# Patient Record
Sex: MALE | Race: WHITE | NOT HISPANIC OR LATINO | Employment: UNEMPLOYED | URBAN - METROPOLITAN AREA
[De-identification: names, ages, dates, MRNs, and addresses within clinical notes are randomized per-mention and may not be internally consistent; named-entity substitution may affect disease eponyms.]

---
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
## 2022-01-09 ENCOUNTER — HEALTH MAINTENANCE LETTER (OUTPATIENT)
Age: 22
End: 2022-01-09

## 2022-04-19 ENCOUNTER — TELEPHONE (OUTPATIENT)
Dept: PLASTIC SURGERY | Facility: CLINIC | Age: 22
End: 2022-04-19
Payer: COMMERCIAL

## 2022-04-19 NOTE — TELEPHONE ENCOUNTER
[facility] Call Center    Phone Message    May a detailed message be left on voicemail: yes     Reason for Call: Other:     Parviz is calling in requesting getting scheduled with Dr Phelps to start the process of top surgery m to f.    Please call him back to get scheduled.     Action Taken: Message routed to:  [facility] Care    Travel Screening: Not Applicable

## 2022-07-11 NOTE — TELEPHONE ENCOUNTER
FUTURE VISIT INFORMATION      FUTURE VISIT INFORMATION:    Date: 10/25/22    Time: 1:00pm    Location: [facility]  REFERRAL INFORMATION:    Reason for visit/diagnosis  top consult    RECORDS REQUESTED FROM:       No recs to collect

## 2022-10-24 ENCOUNTER — TELEPHONE (OUTPATIENT)
Dept: PLASTIC SURGERY | Facility: CLINIC | Age: 22
End: 2022-10-24

## 2022-10-24 NOTE — TELEPHONE ENCOUNTER
LM for patient confirming appointment tomorrow with  at 1:00.    Call back number was provided.  
no

## 2022-10-25 ENCOUNTER — PRE VISIT (OUTPATIENT)
Dept: PLASTIC SURGERY | Facility: CLINIC | Age: 22
End: 2022-10-25

## 2022-10-25 ENCOUNTER — OFFICE VISIT (OUTPATIENT)
Dept: PLASTIC SURGERY | Facility: CLINIC | Age: 22
End: 2022-10-25
Payer: COMMERCIAL

## 2022-10-25 ENCOUNTER — DOCUMENTATION ONLY (OUTPATIENT)
Dept: PLASTIC SURGERY | Facility: CLINIC | Age: 22
End: 2022-10-25

## 2022-10-25 VITALS
HEIGHT: 65 IN | SYSTOLIC BLOOD PRESSURE: 121 MMHG | DIASTOLIC BLOOD PRESSURE: 86 MMHG | HEART RATE: 118 BPM | TEMPERATURE: 98.1 F | BODY MASS INDEX: 24.16 KG/M2 | OXYGEN SATURATION: 99 % | WEIGHT: 145 LBS

## 2022-10-25 DIAGNOSIS — F64.0 GENDER DYSPHORIA IN ADULT: Primary | ICD-10-CM

## 2022-10-25 PROCEDURE — 99203 OFFICE O/P NEW LOW 30 MIN: CPT | Performed by: SURGERY

## 2022-10-25 ASSESSMENT — PAIN SCALES - GENERAL: PAINLEVEL: NO PAIN (0)

## 2022-10-25 NOTE — PROGRESS NOTES
"PLASTICS NEW TOP   HPI: This is a 22 year old biological female transitioning to male with a history of gender dysphoria and pancreatitis who presents today with both parents (Mother is Edyta, father is Mina) for a consultation for top surgery. His pronouns are he/him and his preferred name is Parviz. He was referred by his insurance provider and supported by [employer], and made his appointment 5-6 months ago. He is not currently seeing a therapist. He has been on testosterone for about a year and a half, via injection, administered by [facility]. He has been living his chosen gender identity/role for a year and a half. He binds with [employer]. No breast lumps, skin puckering, nipple drainage, or other breast problems. No history of breast imaging, including mammogram or breast ultrasound.     Medical Hx: Gender dysphoria. No history of asthma, diabetes mellitus, or GERD. No bleeding, clotting, healing or scarring problems. 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     Surgical Hx: 4 procedures for Pancreatitis (stents) and EGD. Also had wisdom teeth removed without complicated response to anesthesia.     Family Hx: No family history of ovarian cancer. Paternal grandmother had breast cancer in her 80s. Sister has mild von Willebrand's which causes her to bruise easily and have frequent epistaxis. Parents are healthy.     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 Relationship status/family: Has a partner, Mamadou (trans male); lives in the dorms. Will stay with family while recovering from surgery. Smoking status: None. Alcohol use: None. Diet: Omnivore. Caffeine: drinks komboucha sometimes. Also has soda sometimes.  Exercise: likes to go to the gym at school. Sleep: Averages 8 hours.    PE: General: Height: 5' 5.5\"  Weight: 140 lbs   Chest:   Nice upper chest contour.   Slight " concavity over central sternum.  Slight asymmetry in breast shape.  Grade Right 2.5 nipple ptosis; left 2 ptosis.   Right (300 g) breast is slightly larger than the Left (400g).   IMFs situated about 1 cm below the pec muscle.   Right IMF situated about 1-2 cm lower than the left  Incisions may touch at midline..   Minimal lateral thoracic rolls.   No anterior axillary folds.    No lymphadenopathy or masses.   Photos taken with consent.     A&P: 22 year old biological female transitioning to male who is a good candidate for gender affirming top surgery with one of the following: bilateral simple mastectomy vs. breast reduction, +/- possible nipple graft reconstruction. He will most likely need a bilateral simple mastectomy with nipple graft reconstruction depending on intraoperative findings and the patient's desired outcome. The patient is interested in nipple grafts. The patient will NOT need a pre-op mammogram in addition to an H&P from their PCP.     At [facility] he has -[facility] and reports he would prefer a surgery date close to this, as he will have a month off of school in addition to the 2 weeks for winter break. Summer would also be a tenable solution.     He did  meet with our Transgender Coordinator today during his visit. He will be in contact via [employer] to discuss communications with staff and timeline. He will also send his letters of support from PCP and PP through [employer]. He did receive an introductory folder of information and contact numbers/names. Any further discussion of risks and complications will be reviewed during the pre-op visit.    Patient accepts the risks of this procedure and would like to proceed with surgery. He is able to give informed consent for this medically necessary procedure.   Once we receive his therapist letter of support we will initiate the prior authorization process.     According to [place] Case Law and [facility] standards of care, with an appropriate letter of support  from a mental health provider, top surgery/mastectomy is medically necessary for the treatment of gender dysphoria.     Total time = 30 minutes, spent on the date of encounter doing chart review, history and physical, dressing changes, documentation, patient education, and any further activity as noted above.     This note was prepared on behalf of Bria Phelps MD by Rina Garcia, a trained medical scribe, based on my observations and the provider's statements to me.

## 2022-10-25 NOTE — NURSING NOTE
"Chief Complaint   Patient presents with     Consult     Parviz, is being seen today for a consult regarding top surgery.       Vitals:    10/25/22 1304   BP: 121/86   BP Location: Left arm   Patient Position: Chair   Cuff Size: Adult Regular   Pulse: 118   Temp: 98.1  F (36.7  C)   TempSrc: Oral   SpO2: 99%   Weight: 65.8 kg (145 lb)   Height: 1.657 m (5' 5.25\")       Body mass index is 23.94 kg/m .      Trudy Martines LPN    "

## 2022-10-25 NOTE — PROGRESS NOTES
[facility] program and services discussed with patient. Educational surgical packet provided and reviewed with patient. Process for accessing surgery discussed, including: [facility] standards of care, letters of support, treatment plan action steps, [place] insurance process, surgery scheduling, and approximate timeline.     Pt has a J term in school and wants to get surgery early/mid January if possible. [facility] and [facility] to [facility] to [employer].  referral list given in case this provider isn't MH provider per surgeon request. Pt questions answered within scope of practice.

## 2022-10-25 NOTE — LETTER
10/25/2022       RE: China Burgos  [address] [place] [address]     Dear Colleague,    Thank you for referring your patient, China Burgos, to the [facility] PLASTIC AND RECONSTRUCTIVE SURGERY CLINIC [place] at [facility]. Please see a copy of my visit note below.    PLASTICS NEW TOP   HPI: This is a 22 year old biological female transitioning to male with a history of gender dysphoria and pancreatitis who presents today with both parents (Mother is Edyta, father is Mina) for a consultation for top surgery. His pronouns are he/him and his preferred name is Parviz. He was referred by his insurance provider and supported by internet info, and made his appointment 5-6 months ago. He is not currently seeing a therapist. He has been on testosterone for about a year and a half, via injection, administered by [facility]. He has been living his chosen gender identity/role for a year and a half. He binds with [employer]. No breast lumps, skin puckering, nipple drainage, or other breast problems. No history of breast imaging, including mammogram or breast ultrasound.     Medical Hx: Gender dysphoria. No history of asthma, diabetes mellitus, or GERD. No bleeding, clotting, healing or scarring problems. 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     Surgical Hx: 4 procedures for Pancreatitis (stents) and EGD. Also had wisdom teeth removed without complicated response to anesthesia.     Family Hx: No family history of ovarian cancer. Paternal grandmother had breast cancer in her 80s. Sister has mild von Willebrand's which causes her to bruise easily and have frequent epistaxis. Parents are healthy.     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 Relationship status/family: Has a partner, Mamadou (trans male); lives in  "the dorms. Will stay with family while recovering from surgery. Smoking status: None. Alcohol use: None. Diet: Omnivore. Caffeine: drinks komboucha sometimes. Also has soda sometimes.  Exercise: likes to go to the gym at school. Sleep: Averages 8 hours.    PE: General: Height: 5' 5.5\"  Weight: 140 lbs   Chest:   Nice upper chest contour.   Slight concavity over central sternum.  Slight asymmetry in breast shape.  Grade Right 2.5 nipple ptosis; left 2 ptosis.   Right (300 g) breast is slightly larger than the Left (400g).   IMFs situated about 1 cm below the pec muscle.   Right IMF situated about 1-2 cm lower than the left  Incisions may touch at midline..   Minimal lateral thoracic rolls.   No anterior axillary folds.    No lymphadenopathy or masses.   Photos taken with consent.     A&P: 22 year old biological female transitioning to male who is a good candidate for gender affirming top surgery with one of the following: bilateral simple mastectomy vs. breast reduction, +/- possible nipple graft reconstruction. He will most likely need a bilateral simple mastectomy with nipple graft reconstruction depending on intraoperative findings and the patient's desired outcome. The patient is interested in nipple grafts. The patient will NOT need a pre-op mammogram in addition to an H&P from their PCP.     At [facility] [employer] [employer] he has J-term and reports he would prefer a surgery date close to this, as he will have a month off of school in addition to the 2 weeks for winter break. Summer would also be a tenable solution.     He did  meet with our Transgender Coordinator today during his visit. He will be in contact via [employer] to discuss communications with staff and timeline. He will also send his letters of support from PCP and PP through [employer]. He did receive an introductory folder of information and contact numbers/names. Any further discussion of risks and complications will be reviewed during the pre-op visit.    Patient " accepts the risks of this procedure and would like to proceed with surgery. He is able to give informed consent for this medically necessary procedure.   Once we receive his therapist letter of support we will initiate the prior authorization process.     According to [place] Case Law and [facility] standards of care, with an appropriate letter of support from a mental health provider, top surgery/mastectomy is medically necessary for the treatment of gender dysphoria.     Total time = 30 minutes, spent on the date of encounter doing chart review, history and physical, dressing changes, documentation, patient education, and any further activity as noted above.     This note was prepared on behalf of Bria Phelps MD by Rina Garcia, a trained medical scribe, based on my observations and the provider's statements to me.         Again, thank you for allowing me to participate in the care of your patient.      Sincerely,    Bria Phelps MD       [Follow-Up Visit] : a follow-up visit for [FreeTextEntry2] : left knee pain

## 2022-10-26 ENCOUNTER — TELEPHONE (OUTPATIENT)
Dept: PSYCHOLOGY | Facility: CLINIC | Age: 22
End: 2022-10-26

## 2022-10-26 NOTE — TELEPHONE ENCOUNTER
Date: 10/26/2022    Client Name:  China Burgos  Preferred Name: Parviz  MRN: [id number]   : 2000  Age:  22 year old    Presenting Problem / Reason for Assessment:     LOS for top surgery    Suggested Program:  TG    Interested in Couples/Family Therapy?  No    Any legal charges pending?:   No    Patient wishes to be contacted regarding Insurance benefits?:  No    Insurance Benefits to be evaluated.  Note will be entered when validated.    Please Verify Registration    Please send Welcome Packet and document date sent.

## 2022-11-08 ENCOUNTER — VIRTUAL VISIT (OUTPATIENT)
Dept: PSYCHOLOGY | Facility: CLINIC | Age: 22
End: 2022-11-08
Payer: COMMERCIAL

## 2022-11-08 DIAGNOSIS — F64.0 GENDER DYSPHORIA IN ADULT: Primary | ICD-10-CM

## 2022-11-08 PROCEDURE — 90791 PSYCH DIAGNOSTIC EVALUATION: CPT | Mod: GT | Performed by: MARRIAGE & FAMILY THERAPIST

## 2022-11-08 NOTE — PROGRESS NOTES
"[facility]  Program in Human Sexuality  Department of Family Medicine & Community Health  [employer] [facility] [facility] [employer]   [address]               [place] [address]  Phone: [phone]  Fax: [phone]  [email or web address]    Transgender Diagnostic (TG) Diagnostic Assessment Interview      Date of Service: 11/08/22  Client Name: China \"Parviz\"SUZY Burgos  YOB: 2000  22 year old  MRN:  [id number]  Gender/Gender Identity: Male  Treating Provider: Rosie Duran PsyD, LMFT  Program: TG  Type of Session: Assessment  Present in Session: Client  Number of Minutes:  60    Video start time: 1  Video end time: 2    Telemedicine Visit: The patient's condition can be safely assessed and treated via synchronous audio and visual telemedicine encounter.      Reason for Telemedicine Visit: Patient convenience (e.g. access to timely appointments / distance to available provider)    Originating Site (Patient Location): Patient's home    Distant Site (Provider Location): [facility]  Consent:  The patient/guardian has verbally consented to: the potential risks and benefits of telemedicine (video visit) versus in person care; bill my insurance or make self-payment for services provided; and responsibility for payment of non-covered services.     Mode of Communication:  Video Conference via [facility]    As the provider I attest to compliance with applicable laws and regulations related to telemedicine.    Ethnicity:     Any relevant cultural/Caodaism issues? (Minority stress, immigration history, level of acculturation, social orientation, time orientation, verbal communication style, Caodaism/spiritual beliefs, etc.)     N/A at this time.     Referral Source [ will get mailing/phone information, sign Referral/Release & send out Thank You letter]: [facility] Referral    Chief Complaint/Presenting Problem and Goals: " Client reported needing letter of support for top surgery. Client has longstanding history of gender dysphoria with birth assigned sex.     ________________________    Transgender Health Services  Program-Specific Information    History of gender dysphoria [Establish long standing discomfort with birth assigned sex]    Client reported that he didn't realize his feelings around gender identity and come to understanding about it until around age 20. Client reports that a child he felt a strong identification with male characters, remembers viewing himself in male roles.  Client reports that he hit puberty summer going into 4th grade and it felt very traumatic. Client reports getting period, very heavy and hard to manage- very stressful. Client reported that he started growing breasts around the same time. Client reported shame, discomfort and loathing around breast development. Client reports that he hated his breasts from the beginning. Client reported that he had a desire for people to not see his chest, thought he had a fear of people sexualizing him/seeing him as a woman.     Client reported that he fell into taking on an older friend/caretaker role but wasn't happy about it, felt alienated from them. Client reported in middle and high school he developed more of a sexual identity, recognized he was bisexual but when he came out to his mom she was not accepting and he felt immense shame.     Client reported that he struggled with sexual identity a lot, felt like he had to prove it to his parents. Client reported that during his senior year he experienced gender euphoria when being called he/him, trying on male gender expression. Client reported he came out and parents were not accepting, younger sister was, friends were. Client reported that he started testosterone without parents knowing at [facility].    Client reports binding in public, but feeling insecure and dysphoria with this. Client reports  "appreciating the changes that testosterone has brought. Client reports complicated relationship with genitals. Client reports wanting phalloplasty but also wanting to keep vaginal canal, if possible.     Relevant Sexuality Information:    Client reports that he enjoys having different sexual roles, does feel dysphoric about not having a penis, but both he and his partner find ways to have pleasure.  _________________________________________________________________________      Mental Health History: Client reports depression and anxiety, diagnosed in middle school. Client has been on venlafaxine with positive effect. Client reports no SI/SA, no hospitalizations and no current therapy.    Verbally administer Gibson - Suicide Severity Rating Scale (C-SSRS). Use \"Screenings\" tab (Epic left side bar)      Substance Use: Client does not use substances at this time.     Medical History Client has been hospitalized for pancreatitis.     Current Significant Relationship/Partner: Client reports dating Mamadou (robertan) since freshman year of college at [employer]. Client reports strong relationship with his partner, lots of support.     Educational History: [employer] (client wants to be a teacher).     Legal Issues [past, present]: None    _________________________________________________________________________     CONCLUSIONS      Mental Status   Appearance:  Casually dressed and Well groomed  Behavior/relationship to examiner/demeanor:  Cooperative and Engaged  Orientation: Oriented to person, place, time and situation  Speech Rate:  Normal  Speech Spontaneity:  Normal  Mood:  comfortable  Affect:  Appropriate/mood-congruent  Thought Process (Associations):  Logical, Linear and Goal directed  Thought Content:  no evidence of suicidal or homicidal ideation and no overt psychosis  Abnormal Perception:  None  Attention/Concentration:  Normal  Language:  Intact  Insight:  Good  Judgment:  " Good      DSM-5 Diagnosis:  Encounter Diagnosis   Name Primary?     Gender dysphoria in adult Yes       Interactive Complexity  Communication difficulties present during current the psychiatric procedure included:  1. N/A  Conclusions/Recommendations/Initial Treatment Goals: (The treatment plan should be developed from the assessment and in the chart the 1st session following the completion of the assessment.  It needs to be reviewed and signed by the patient and therapist).    The client is a 22 year old Not  or  person assigned female at birth who identifies as male. Based on the client's current report of symptoms, client meets criteria for gender dysphoria in adulthood. The client's mental health concerns affect client's ability to function socially and have been causing clinically significant distress. The client reports no drug/alcohol use frequency and duration. The patient is also struggling with anxiety/depression. Client denies safety concerns. Based on the client's reported symptoms and impact on functioning, the plan for the patient is:  1. Start supportive individual/family therapy with a provider specialized in gender health.     2. Continue to assess the impact of client's family and relational patterns on their current well-being.       Rosie Duran PsyD, LMFT

## 2022-11-08 NOTE — LETTER
2022         Re: Parviz Burgos   : 2000   Primary letter of support for gender confirmation surgery      Dr. Phelps:    Parviz has received services through the [facility]'s Transgender Health Program since 2022. Parviz is a 22-year-old transgender man who initially sought services to receive support and assistance with transition and to obtain letters of support for medical transition goals. I am a licensed couple and family therapist specialized in sexual and gender health and employed at the [employer] since .      I met with Parviz on 2022 in order to provide a primary letter of support for GCS (top surgery). Parviz, was assessed by this provider during an individual therapy session and currently meets the World Professional Association for Transgender Health (WPATH) Standards of Care for surgery of gender dysphoric persons. Parviz has been living continuously in the gender role that is congruent with his gender identity since . Since , he has been meeting with a [facility] at [facility] to seek and begin hormone replacement therapy (HRT). Parviz completed legal transition with name and gender marker changes earlier in .      Upon completion of psychological evaluation, Parviz was diagnosed with 312.89 Gender Dysphoria as his primary diagnosis. Parviz also has been diagnosed with anxiety and depression since adolescence and has been prescribed Venlafaxine with positive effect. It is my opinion that his mood has well-controlled with medication.     Given his strong and persistent gender dysphoria, it is clear that top surgery is indicated and medically necessary to relieve his anatomical dysphoria. I fully support him in pursuing surgical interventions to reduce dysphoria.    Surgery is considered medically necessary treatment by the WPATH Standards of Care for transgender  persons. On behalf of our Transgender Services team, I support his decision to pursue medically necessary surgery at this time.      In some cases, denial of access to this surgery can lead to increased isolation, decreased work performance, and decreased sexual and interpersonal functioning. Surgery is expected to alleviate his dysphoria, increase comfort with self, and improve interpersonal, sexual, and vocational functioning.     Parviz has educated himself about the surgery and is aware of its risks and benefits. He has developed an extensive after care plan. Given his concerns and the availability of this type of surgery, I support his choice of surgeon.     My support is based on the psychological indication for surgery and did not include a physical examination to assess medical contra-indications for the surgical procedure. This letter of support is valid for one year from the date of approval.  If you have any questions, or are in need of additional information, please do not hesitate to contact me.              Rosie Duran PsyD, LANG (she/her/hers)    , Relationship and Sex Therapy     [facility]    [employer]    Family Medicine and Community Health    sexualhealth.[facility]

## 2022-11-15 ENCOUNTER — DOCUMENTATION ONLY (OUTPATIENT)
Dept: PLASTIC SURGERY | Facility: CLINIC | Age: 22
End: 2022-11-15

## 2022-11-15 NOTE — PROGRESS NOTES
[facility] :  Care Coordination Note     SITUATION   Parviz Burgos is a 22 year old adult who is receiving support for:  Care Team  .    BACKGROUND     Reviewed LOS. Meets Criteria. Updated pt list     ASSESSMENT     Surgery              CGC Assessment  Comprehensive Gender Care (CGC) Enrollment: Enrolled  Patient has a therapist: Yes  Name of therapist: Rosie Duran  Letter of support #1: Received  Letter #1 Date: 11/11/22  Surgery being considered: Yes  Mastectomy: Yes          PLAN          Nursing Interventions:       Follow-up plan:  Case request to be placed, surgery to be scheduled.        MINA GOMES LPCC

## 2022-11-21 ENCOUNTER — TELEPHONE (OUTPATIENT)
Dept: PLASTIC SURGERY | Facility: CLINIC | Age: 22
End: 2022-11-21

## 2022-11-21 NOTE — TELEPHONE ENCOUNTER
Pt called to check in about LOS and next steps. Case still needs orders for surgery from Dr. Phelps. Called back and LVM with explanation of process for scheduling and requesting PA.

## 2022-11-29 ENCOUNTER — DOCUMENTATION ONLY (OUTPATIENT)
Dept: PLASTIC SURGERY | Facility: CLINIC | Age: 22
End: 2022-11-29

## 2022-11-29 NOTE — PROGRESS NOTES
Pt has seen Dr Phelps for top surgery consult and has LOS (reviewed) in chart. Requested case request from Dr Phelps.    Gavin Ambrocio RN

## 2022-12-04 DIAGNOSIS — F64.0 GENDER DYSPHORIA IN ADULT: Primary | ICD-10-CM

## 2022-12-04 RX ORDER — CEFAZOLIN SODIUM 2 G/50ML
2 SOLUTION INTRAVENOUS SEE ADMIN INSTRUCTIONS
Status: CANCELLED | OUTPATIENT
Start: 2022-12-04

## 2022-12-04 RX ORDER — CEFAZOLIN SODIUM 2 G/50ML
2 SOLUTION INTRAVENOUS
Status: CANCELLED | OUTPATIENT
Start: 2022-12-04

## 2022-12-12 ENCOUNTER — TELEPHONE (OUTPATIENT)
Dept: PLASTIC SURGERY | Facility: CLINIC | Age: 22
End: 2022-12-12

## 2022-12-12 NOTE — TELEPHONE ENCOUNTER
Parviz called asking for an update on scheduling. He will have to wait until the end of the semester for his surgery.

## 2022-12-20 ENCOUNTER — TELEPHONE (OUTPATIENT)
Dept: PLASTIC SURGERY | Facility: CLINIC | Age: 22
End: 2022-12-20

## 2022-12-20 NOTE — TELEPHONE ENCOUNTER
Spoke with patient regarding scheduling surgery with Dr. Phelps.    Holding date 6/9. Patient wants to discuss with family and plan ample time to prepare after finals. Patient to call writer once discussion with family is completed and date is solidified.    Will schedule pre-surgical consult and post-op appointments. Patient will schedule H&P with PCP. Provided direct number to patient.    __    Sabine Cleary, Senior Perioperative Coordinator, on 12/20/2022 at 9:03 AM  P: [phone]

## 2022-12-21 ENCOUNTER — HOSPITAL ENCOUNTER (OUTPATIENT)
Facility: AMBULATORY SURGERY CENTER | Age: 22
End: 2022-12-21
Attending: SURGERY | Admitting: SURGERY
Payer: COMMERCIAL

## 2022-12-21 NOTE — TELEPHONE ENCOUNTER
Received voicemail from patient on 12/20 regarding rescheduling surgery with Dr. Phelps to 6/14.    RN Care Coordinator: Gavin Ambrocio; [phone]    Surgery is scheduled with Dr. Phelps   Date: 6/14   Location: [facility]  Scheduled per: patient requested timeframe    H&P to be completed by Primary Care team; patient to schedule    COVID test: Patient to take an at-home COVID-19 test 1-2 days before their surgery date. Patient to take a picture of the result and either upload this to [facility], or show the surgeon the picture during pre-op.     Surgical consult: 3/28 - patient requested this to be over Spring Break     Post-op visit(s): 6/20 + 7/25    Patient will receive a phone call from pre-admission nurses 1-2 days prior to surgery with arrival and start time.        Spoke with the patient and was able to confirm all scheduled information.       Patient questions/concerns: N/A       Surgery packet was sent via [facility]    __    Sabine Cleary, Senior Perioperative Coordinator, on 12/21/2022 at 11:59 AM  P: [phone]

## 2022-12-22 ENCOUNTER — DOCUMENTATION ONLY (OUTPATIENT)
Dept: PLASTIC SURGERY | Facility: CLINIC | Age: 22
End: 2022-12-22

## 2022-12-22 NOTE — PROGRESS NOTES
[facility] :  Care Coordination Note     SITUATION   Parviz Burgos is a 22 year old adult who is receiving support for: gender dysphoria.    BACKGROUND     Pt has seen Dr Phelps for gender affirming top surgery consultation  Pt has surgery scheduled for 6/14/23    ASSESSMENT     Pt has LOS in chart, reviewed by   Pt does not need a pre-op mammogram, per Dr Phelps's consultation note.      PLAN     Follow-up plan:    Will contact pt to inform of need for pre-op H&P within 30 days of surgery  Pt to have pre-op surgical consultation with Dr Lenin Ambrocio RN

## 2023-03-28 ENCOUNTER — OFFICE VISIT (OUTPATIENT)
Dept: PLASTIC SURGERY | Facility: CLINIC | Age: 23
End: 2023-03-28
Payer: COMMERCIAL

## 2023-03-28 VITALS
DIASTOLIC BLOOD PRESSURE: 82 MMHG | HEART RATE: 86 BPM | SYSTOLIC BLOOD PRESSURE: 114 MMHG | BODY MASS INDEX: 25.49 KG/M2 | WEIGHT: 153 LBS | OXYGEN SATURATION: 98 % | HEIGHT: 65 IN

## 2023-03-28 DIAGNOSIS — F64.0 GENDER DYSPHORIA IN ADULT: Primary | ICD-10-CM

## 2023-03-28 PROCEDURE — 99212 OFFICE O/P EST SF 10 MIN: CPT | Performed by: SURGERY

## 2023-03-28 ASSESSMENT — PAIN SCALES - GENERAL: PAINLEVEL: NO PAIN (0)

## 2023-03-28 NOTE — LETTER
3/28/2023       RE: Sergio Burgos  [address] [place] [address]     Dear Colleague,    Thank you for referring your patient, Sergio Burgos, to the [facility] PLASTIC AND RECONSTRUCTIVE SURGERY CLINIC [place] [facility]. Please see a copy of my visit note below.    PLASTICS PRE-OP  This is a 22 year old biological female transitioning to male who presents for his pre-op visit prior to bilateral simple mastectomy with nipple graft reconstruction scheduled for 6/14/2023. He is here today by himself. His letter of support from Rosie Duran PsyD, LMFT has been received. History and physical are scheduled. COVID test will be performed at home 24-48 hours prior to surgery.    Sergio's family will be taking care of him postoperatively. By the time surgery is complete he will have finished school at [employer]. He is interested in grad school but is also interested in teaching high school history. 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     Our LPN, Best, discussed periop instructions with the patient including: not eating anything 8 hours prior to surgery, drinking clear liquids up to 2 hours before surgery except for morning medications with a sip of water, the preop shower with surgical soap which was given, and wearing a button- or zip-up shirt on the day of surgery. The patient was also instructed to avoid NSAIDs x 1 week both before AND after surgery, but he may take Tylenol post-op for pain as needed. The patient was provided with a folder of information on kai-op topics, including where the surgery will be.     I discussed the following with the patient; preop, intraop and postop phases of care on the day of surgery, the placement of a bladder catheter during surgery that will likely be removed in recovery, postop cares and limitations with relation to home and work settings, 5-lb weight restriction for the  first 3 weeks postop, and how long to maintain limited activities. We also discussed Zofran, oxycodone, Z-stephy, and antipruritics which will be prescribed. We discussed that preventing constipation will be their responsibility, and we discussed methods such as aloe, prune juice or Miralax.     In addition, we went over the possible risks and complications involved with this elective procedure. These include but are not limited to: infection, bleeding, hematoma/seroma formation, and poor healing (including dehiscence, nipple graft loss, or hypertrophic scarring). We also discussed the possibility of altered chest sensation (either hypo or hypersensitive), residual deformities and asymmetries, possible further surgical revisions, and possible injury to surrounding neurovascular and musculoskeletal structures, including intra-axillary or intra-thoracic. We lastly discussed anesthetic risks including DVT/PE or cardiopulmonary events.      All questions were answered. Eladio will bring any other questions that arise to the day of surgery.    Total time = 10 minutes, spent on the date of encounter doing chart review, history and physical, dressing changes, documentation, patient education, and any further activity as noted above.     This note was prepared on behalf of Bria Phelps MD by Rina Garcia (they/them), a trained medical scribe, based on my observations and the provider's statements to me.     Sincerely,    Bria Phelps MD

## 2023-03-28 NOTE — PROGRESS NOTES
PLASTICS PRE-OP  This is a 22 year old biological female transitioning to male who presents for his pre-op visit prior to bilateral simple mastectomy with nipple graft reconstruction scheduled for 6/14/2023. He is here today by himself. His letter of support from Rosie Duran PsyD, LMFT has been received. History and physical are scheduled. COVID test will be performed at home 24-48 hours prior to surgery.    Sergio's family will be taking care of him postoperatively. By the time surgery is complete he will have finished school at [employer]. He is interested in grad school but is also interested in teaching high school history. Currently writing a 20 page paper on the [employer].     Our LPN, Best, discussed periop instructions with the patient including: not eating anything 8 hours prior to surgery, drinking clear liquids up to 2 hours before surgery except for morning medications with a sip of water, the preop shower with surgical soap which was given, and wearing a button- or zip-up shirt on the day of surgery. The patient was also instructed to avoid NSAIDs x 1 week both before AND after surgery, but he may take Tylenol post-op for pain as needed. The patient was provided with a folder of information on kai-op topics, including where the surgery will be.     I discussed the following with the patient; preop, intraop and postop phases of care on the day of surgery, the placement of a bladder catheter during surgery that will likely be removed in recovery, postop cares and limitations with relation to home and work settings, 5-lb weight restriction for the first 3 weeks postop, and how long to maintain limited activities. We also discussed Zofran, oxycodone, Z-stephy, and antipruritics which will be prescribed. We discussed that preventing constipation will be their responsibility, and we discussed methods such as aloe, prune juice or Miralax.     In addition, we went over the possible risks and complications involved  with this elective procedure. These include but are not limited to: infection, bleeding, hematoma/seroma formation, and poor healing (including dehiscence, nipple graft loss, or hypertrophic scarring). We also discussed the possibility of altered chest sensation (either hypo or hypersensitive), residual deformities and asymmetries, possible further surgical revisions, and possible injury to surrounding neurovascular and musculoskeletal structures, including intra-axillary or intra-thoracic. We lastly discussed anesthetic risks including DVT/PE or cardiopulmonary events.      All questions were answered. Eladio will bring any other questions that arise to the day of surgery.    Total time = 10 minutes, spent on the date of encounter doing chart review, history and physical, dressing changes, documentation, patient education, and any further activity as noted above.     This note was prepared on behalf of Bria Phelps MD by Rina Garcia (they/them), a trained medical scribe, based on my observations and the provider's statements to me.

## 2023-03-28 NOTE — NURSING NOTE
"Chief Complaint   Patient presents with     LLOYD Hill, is being seen today for a pre-op DOS 6/14/23.       Vitals:    03/28/23 1616   BP: 114/82   BP Location: Right arm   Patient Position: Chair   Cuff Size: Adult Regular   Pulse: 86   SpO2: 98%   Weight: 69.4 kg (153 lb)   Height: 1.657 m (5' 5.25\")       Body mass index is 25.27 kg/m .      Trudy Martines LPN    "

## 2023-04-16 ENCOUNTER — HEALTH MAINTENANCE LETTER (OUTPATIENT)
Age: 23
End: 2023-04-16

## 2023-05-31 ENCOUNTER — TRANSFERRED RECORDS (OUTPATIENT)
Dept: HEALTH INFORMATION MANAGEMENT | Facility: CLINIC | Age: 23
End: 2023-05-31
Payer: COMMERCIAL

## 2023-06-12 ENCOUNTER — TELEPHONE (OUTPATIENT)
Dept: PLASTIC SURGERY | Facility: CLINIC | Age: 23
End: 2023-06-12
Payer: COMMERCIAL

## 2023-06-12 NOTE — TELEPHONE ENCOUNTER
Pt left voicemail asking if needing to provide any payment for surgery with Dr Phelps on 6/14. Discussed with pt that our PA team notified us that their insurance approved authorization for this surgery, which typically means the terms of pt's insurance plan will apply. Discussed with pt that this RN does not work on the financial components of surgery and provided the Cost of Care phone number for pt to call. Asked pt to call back if questions are not answered by calling this line. Pt thanked writer for calling and will follow up if needed.

## 2023-06-13 ENCOUNTER — TELEPHONE (OUTPATIENT)
Dept: PLASTIC SURGERY | Facility: CLINIC | Age: 23
End: 2023-06-13
Payer: COMMERCIAL

## 2023-06-13 PROBLEM — F64.0 GENDER DYSPHORIA IN ADULT: Status: ACTIVE | Noted: 2022-12-20

## 2023-06-13 NOTE — TELEPHONE ENCOUNTER
Pt left voicemail stating that his dad tested positive for COVID yesterday and his mom tested positive this morning. He states that in the past week his mom, dad, and he have had similar symptoms, including a cold and sore throat. Pt lives with mom and dad and reports being in frequent, close proximity to them. Pt has surgery with Dr Phelps tomorrow and is wondering if he will need to cancel surgery. Discussed with Dr Phelps who said pt will need to cancel. Discussed with the patient, who is understanding but devastated. Sabine will call pt to offer next available for rescheduling. Pt would like to be contacted if there is a surgery cancellation before the next available surgery date. Pt thanked writer for calling

## 2023-06-13 NOTE — TELEPHONE ENCOUNTER
Spoke with patient regarding rescheduling surgery with Dr. Phelps due to COVID-19 exposure and symptoms.    Surgery is scheduled with Dr. Phelps   Date: 7/7   Location: [facility]  Scheduled per: next available date    H&P already completed by Primary Care team, but since it will be outside of the 30-day window, Dr. Phelps will update DOS      Post-op visit(s):   7/14 with Mya Almonte PA-C, [facility]  8/22 with Dr. Phelps, [facility]  Patient will receive a phone call from pre-admission nurses 1-2 days prior to surgery with arrival and start time. Approximate arrival time/surgery time given to patient: 8:45 AM. Patient aware times are subject to change up until day before surgery.       Spoke with the patient and was able to confirm all scheduled information.       Patient questions/concerns: N/A       Surgery packet was not needed per patient    __    Sabine Cleary, Senior Perioperative Coordinator, on 6/13/2023 at 10:55 AM  P: [phone]

## 2023-07-02 ENCOUNTER — ANESTHESIA EVENT (OUTPATIENT)
Dept: SURGERY | Facility: AMBULATORY SURGERY CENTER | Age: 23
End: 2023-07-02
Payer: COMMERCIAL

## 2023-07-06 NOTE — PROGRESS NOTES
"Plastic Surgery Outpatient Visit    ID: Sergio Garrett Burgos is a 22 year old male s/p bilateral mastectomy with nipple grafts for gender dysphoria 7/7/2023 with Dr. Phelps.     S: Doing well. Drain output <25cc daily. Pain controlled, still taking tylenol every 4-5 hours.     O:  /82   Pulse 108   Temp 98  F (36.7  C)   Ht 1.651 m (5' 5\")   Wt 70.8 kg (156 lb 1.6 oz)   SpO2 97%   BMI 25.98 kg/m     General: NAD  Chest: bilateral chest incisions c/d/i. Nipple grafts well adhered bilaterally. Mild resolving ecchymosis. No significant swelling.     PATH: pending    A/P:  -healing well  -drains removed today  -nipple dressings x1 week  -wrap x1 week  - tape off in 2 weeks.  -Trex/5lb lifting restrictions x2 more weeks.  -RTC 6 weeks with Dr. Lenin Almonte PA-C  Plastic and Reconstructive Surgery    20 minutes spent on the date of the encounter doing chart review, history and physical, dressing changes, documentation and further activity as noted above.      "

## 2023-07-07 ENCOUNTER — MYC MEDICAL ADVICE (OUTPATIENT)
Dept: SURGERY | Facility: AMBULATORY SURGERY CENTER | Age: 23
End: 2023-07-07
Payer: COMMERCIAL

## 2023-07-07 ENCOUNTER — HOSPITAL ENCOUNTER (OUTPATIENT)
Facility: AMBULATORY SURGERY CENTER | Age: 23
Discharge: HOME | End: 2023-07-07
Attending: SURGERY
Payer: COMMERCIAL

## 2023-07-07 ENCOUNTER — ANESTHESIA (OUTPATIENT)
Dept: SURGERY | Facility: AMBULATORY SURGERY CENTER | Age: 23
End: 2023-07-07
Payer: COMMERCIAL

## 2023-07-07 VITALS
DIASTOLIC BLOOD PRESSURE: 88 MMHG | RESPIRATION RATE: 14 BRPM | HEIGHT: 65 IN | BODY MASS INDEX: 25.83 KG/M2 | TEMPERATURE: 97 F | SYSTOLIC BLOOD PRESSURE: 132 MMHG | WEIGHT: 155 LBS | OXYGEN SATURATION: 95 % | HEART RATE: 119 BPM

## 2023-07-07 DIAGNOSIS — F64.0 GENDER DYSPHORIA IN ADULT: Primary | ICD-10-CM

## 2023-07-07 PROCEDURE — 19303 MAST SIMPLE COMPLETE: CPT | Mod: RT

## 2023-07-07 PROCEDURE — 88305 TISSUE EXAM BY PATHOLOGIST: CPT | Mod: TC | Performed by: SURGERY

## 2023-07-07 PROCEDURE — 19350 NIPPLE/AREOLA RECONSTRUCTION: CPT | Mod: LT

## 2023-07-07 PROCEDURE — 88305 TISSUE EXAM BY PATHOLOGIST: CPT | Mod: 26 | Performed by: PATHOLOGY

## 2023-07-07 RX ORDER — CEFAZOLIN SODIUM 2 G/50ML
2 SOLUTION INTRAVENOUS SEE ADMIN INSTRUCTIONS
Status: DISCONTINUED | OUTPATIENT
Start: 2023-07-07 | End: 2023-07-08 | Stop reason: HOSPADM

## 2023-07-07 RX ORDER — HYDROMORPHONE HYDROCHLORIDE 1 MG/ML
0.4 INJECTION, SOLUTION INTRAMUSCULAR; INTRAVENOUS; SUBCUTANEOUS EVERY 5 MIN PRN
Status: DISCONTINUED | OUTPATIENT
Start: 2023-07-07 | End: 2023-07-08 | Stop reason: HOSPADM

## 2023-07-07 RX ORDER — FENTANYL CITRATE 50 UG/ML
INJECTION, SOLUTION INTRAMUSCULAR; INTRAVENOUS PRN
Status: DISCONTINUED | OUTPATIENT
Start: 2023-07-07 | End: 2023-07-07

## 2023-07-07 RX ORDER — BUPIVACAINE HYDROCHLORIDE 2.5 MG/ML
INJECTION, SOLUTION INFILTRATION; PERINEURAL PRN
Status: DISCONTINUED | OUTPATIENT
Start: 2023-07-07 | End: 2023-07-07 | Stop reason: HOSPADM

## 2023-07-07 RX ORDER — HYDRALAZINE HYDROCHLORIDE 20 MG/ML
2.5-5 INJECTION INTRAMUSCULAR; INTRAVENOUS EVERY 10 MIN PRN
Status: DISCONTINUED | OUTPATIENT
Start: 2023-07-07 | End: 2023-07-08 | Stop reason: HOSPADM

## 2023-07-07 RX ORDER — LIDOCAINE HYDROCHLORIDE 20 MG/ML
INJECTION, SOLUTION INFILTRATION; PERINEURAL PRN
Status: DISCONTINUED | OUTPATIENT
Start: 2023-07-07 | End: 2023-07-07

## 2023-07-07 RX ORDER — OXYCODONE HYDROCHLORIDE 5 MG/1
5 TABLET ORAL EVERY 6 HOURS PRN
Qty: 12 TABLET | Refills: 0 | Status: SHIPPED | OUTPATIENT
Start: 2023-07-07 | End: 2023-07-10

## 2023-07-07 RX ORDER — SODIUM CHLORIDE, SODIUM LACTATE, POTASSIUM CHLORIDE, CALCIUM CHLORIDE 600; 310; 30; 20 MG/100ML; MG/100ML; MG/100ML; MG/100ML
INJECTION, SOLUTION INTRAVENOUS CONTINUOUS
Status: DISCONTINUED | OUTPATIENT
Start: 2023-07-07 | End: 2023-07-08 | Stop reason: HOSPADM

## 2023-07-07 RX ORDER — ONDANSETRON 4 MG/1
4 TABLET, ORALLY DISINTEGRATING ORAL EVERY 30 MIN PRN
Status: DISCONTINUED | OUTPATIENT
Start: 2023-07-07 | End: 2023-07-08 | Stop reason: HOSPADM

## 2023-07-07 RX ORDER — HYDROXYZINE HYDROCHLORIDE 25 MG/1
25 TABLET, FILM COATED ORAL 3 TIMES DAILY PRN
Qty: 12 TABLET | Refills: 1 | Status: SHIPPED | OUTPATIENT
Start: 2023-07-07

## 2023-07-07 RX ORDER — METOPROLOL TARTRATE 1 MG/ML
1-2 INJECTION, SOLUTION INTRAVENOUS EVERY 5 MIN PRN
Status: DISCONTINUED | OUTPATIENT
Start: 2023-07-07 | End: 2023-07-08 | Stop reason: HOSPADM

## 2023-07-07 RX ORDER — PROPOFOL 10 MG/ML
INJECTION, EMULSION INTRAVENOUS PRN
Status: DISCONTINUED | OUTPATIENT
Start: 2023-07-07 | End: 2023-07-07

## 2023-07-07 RX ORDER — SODIUM CHLORIDE, SODIUM LACTATE, POTASSIUM CHLORIDE, CALCIUM CHLORIDE 600; 310; 30; 20 MG/100ML; MG/100ML; MG/100ML; MG/100ML
INJECTION, SOLUTION INTRAVENOUS CONTINUOUS PRN
Status: DISCONTINUED | OUTPATIENT
Start: 2023-07-07 | End: 2023-07-07

## 2023-07-07 RX ORDER — ONDANSETRON 2 MG/ML
4 INJECTION INTRAMUSCULAR; INTRAVENOUS EVERY 30 MIN PRN
Status: DISCONTINUED | OUTPATIENT
Start: 2023-07-07 | End: 2023-07-08 | Stop reason: HOSPADM

## 2023-07-07 RX ORDER — FENTANYL CITRATE 50 UG/ML
25 INJECTION, SOLUTION INTRAMUSCULAR; INTRAVENOUS EVERY 5 MIN PRN
Status: DISCONTINUED | OUTPATIENT
Start: 2023-07-07 | End: 2023-07-08 | Stop reason: HOSPADM

## 2023-07-07 RX ORDER — FENTANYL CITRATE 50 UG/ML
50 INJECTION, SOLUTION INTRAMUSCULAR; INTRAVENOUS EVERY 5 MIN PRN
Status: DISCONTINUED | OUTPATIENT
Start: 2023-07-07 | End: 2023-07-08 | Stop reason: HOSPADM

## 2023-07-07 RX ORDER — OXYCODONE HYDROCHLORIDE 5 MG/1
5 TABLET ORAL EVERY 4 HOURS PRN
Status: DISCONTINUED | OUTPATIENT
Start: 2023-07-07 | End: 2023-07-08 | Stop reason: HOSPADM

## 2023-07-07 RX ORDER — ONDANSETRON 4 MG/1
4 TABLET, ORALLY DISINTEGRATING ORAL EVERY 8 HOURS PRN
Qty: 12 TABLET | Refills: 0 | Status: SHIPPED | OUTPATIENT
Start: 2023-07-07

## 2023-07-07 RX ORDER — CEFAZOLIN SODIUM 2 G/50ML
2 SOLUTION INTRAVENOUS
Status: COMPLETED | OUTPATIENT
Start: 2023-07-07 | End: 2023-07-07

## 2023-07-07 RX ORDER — HYDROMORPHONE HYDROCHLORIDE 1 MG/ML
0.2 INJECTION, SOLUTION INTRAMUSCULAR; INTRAVENOUS; SUBCUTANEOUS EVERY 5 MIN PRN
Status: DISCONTINUED | OUTPATIENT
Start: 2023-07-07 | End: 2023-07-08 | Stop reason: HOSPADM

## 2023-07-07 RX ORDER — OXYCODONE HYDROCHLORIDE 5 MG/1
10 TABLET ORAL EVERY 4 HOURS PRN
Status: DISCONTINUED | OUTPATIENT
Start: 2023-07-07 | End: 2023-07-08 | Stop reason: HOSPADM

## 2023-07-07 RX ORDER — AZITHROMYCIN 250 MG/1
TABLET, FILM COATED ORAL
Qty: 6 TABLET | Refills: 0 | Status: SHIPPED | OUTPATIENT
Start: 2023-07-07 | End: 2023-07-12

## 2023-07-07 RX ORDER — PROPOFOL 10 MG/ML
INJECTION, EMULSION INTRAVENOUS CONTINUOUS PRN
Status: DISCONTINUED | OUTPATIENT
Start: 2023-07-07 | End: 2023-07-07

## 2023-07-07 RX ORDER — ONDANSETRON 2 MG/ML
INJECTION INTRAMUSCULAR; INTRAVENOUS PRN
Status: DISCONTINUED | OUTPATIENT
Start: 2023-07-07 | End: 2023-07-07

## 2023-07-07 RX ORDER — DEXAMETHASONE SODIUM PHOSPHATE 4 MG/ML
INJECTION, SOLUTION INTRA-ARTICULAR; INTRALESIONAL; INTRAMUSCULAR; INTRAVENOUS; SOFT TISSUE PRN
Status: DISCONTINUED | OUTPATIENT
Start: 2023-07-07 | End: 2023-07-07

## 2023-07-07 RX ORDER — LIDOCAINE 40 MG/G
CREAM TOPICAL
Status: DISCONTINUED | OUTPATIENT
Start: 2023-07-07 | End: 2023-07-08 | Stop reason: HOSPADM

## 2023-07-07 RX ORDER — ACETAMINOPHEN 325 MG/1
975 TABLET ORAL ONCE
Status: COMPLETED | OUTPATIENT
Start: 2023-07-07 | End: 2023-07-07

## 2023-07-07 RX ORDER — FENTANYL CITRATE 50 UG/ML
25 INJECTION, SOLUTION INTRAMUSCULAR; INTRAVENOUS
Status: DISCONTINUED | OUTPATIENT
Start: 2023-07-07 | End: 2023-07-08 | Stop reason: HOSPADM

## 2023-07-07 RX ADMIN — SODIUM CHLORIDE, SODIUM LACTATE, POTASSIUM CHLORIDE, CALCIUM CHLORIDE: 600; 310; 30; 20 INJECTION, SOLUTION INTRAVENOUS at 12:23

## 2023-07-07 RX ADMIN — PROPOFOL 100 MG: 10 INJECTION, EMULSION INTRAVENOUS at 12:35

## 2023-07-07 RX ADMIN — Medication 0.5 MG: at 14:14

## 2023-07-07 RX ADMIN — FENTANYL CITRATE 150 MCG: 50 INJECTION, SOLUTION INTRAMUSCULAR; INTRAVENOUS at 13:06

## 2023-07-07 RX ADMIN — ACETAMINOPHEN 975 MG: 325 TABLET ORAL at 10:25

## 2023-07-07 RX ADMIN — SODIUM CHLORIDE, SODIUM LACTATE, POTASSIUM CHLORIDE, CALCIUM CHLORIDE: 600; 310; 30; 20 INJECTION, SOLUTION INTRAVENOUS at 14:15

## 2023-07-07 RX ADMIN — PROPOFOL 200 MCG/KG/MIN: 10 INJECTION, EMULSION INTRAVENOUS at 13:07

## 2023-07-07 RX ADMIN — CEFAZOLIN SODIUM 2 G: 2 SOLUTION INTRAVENOUS at 12:23

## 2023-07-07 RX ADMIN — FENTANYL CITRATE 50 MCG: 50 INJECTION, SOLUTION INTRAMUSCULAR; INTRAVENOUS at 12:34

## 2023-07-07 RX ADMIN — PROPOFOL 200 MCG/KG/MIN: 10 INJECTION, EMULSION INTRAVENOUS at 12:34

## 2023-07-07 RX ADMIN — PROPOFOL 150 MCG/KG/MIN: 10 INJECTION, EMULSION INTRAVENOUS at 13:50

## 2023-07-07 RX ADMIN — ONDANSETRON 4 MG: 2 INJECTION INTRAMUSCULAR; INTRAVENOUS at 15:35

## 2023-07-07 RX ADMIN — FENTANYL CITRATE 50 MCG: 50 INJECTION, SOLUTION INTRAMUSCULAR; INTRAVENOUS at 15:26

## 2023-07-07 RX ADMIN — ONDANSETRON 4 MG: 4 TABLET, ORALLY DISINTEGRATING ORAL at 17:07

## 2023-07-07 RX ADMIN — PROPOFOL 125 MCG/KG/MIN: 10 INJECTION, EMULSION INTRAVENOUS at 14:38

## 2023-07-07 RX ADMIN — PROPOFOL 200 MG: 10 INJECTION, EMULSION INTRAVENOUS at 12:34

## 2023-07-07 RX ADMIN — SODIUM CHLORIDE, SODIUM LACTATE, POTASSIUM CHLORIDE, CALCIUM CHLORIDE: 600; 310; 30; 20 INJECTION, SOLUTION INTRAVENOUS at 10:41

## 2023-07-07 RX ADMIN — DEXAMETHASONE SODIUM PHOSPHATE 4 MG: 4 INJECTION, SOLUTION INTRA-ARTICULAR; INTRALESIONAL; INTRAMUSCULAR; INTRAVENOUS; SOFT TISSUE at 12:53

## 2023-07-07 RX ADMIN — OXYCODONE HYDROCHLORIDE 5 MG: 5 TABLET ORAL at 16:20

## 2023-07-07 RX ADMIN — LIDOCAINE HYDROCHLORIDE 100 MG: 20 INJECTION, SOLUTION INFILTRATION; PERINEURAL at 12:34

## 2023-07-07 NOTE — ANESTHESIA PREPROCEDURE EVALUATION
Anesthesia Pre-Procedure Evaluation    Patient: Sergio Burgos   MRN: [id number] : 2000        Procedure : Procedure(s):  MASTECTOMY, BILATERAL, SIMPLE, possible nipple grafts. OnQ          Past Medical History:   Diagnosis Date     NO ACTIVE PROBLEMS       Past Surgical History:   Procedure Laterality Date     NO HISTORY OF SURGERY        Allergies   Allergen Reactions     Cats      Sulfa Antibiotics      Mother has sulfa allergy      Social History     Tobacco Use     Smoking status: Never     Smokeless tobacco: Never     Tobacco comments:     nonsmoking home   Substance Use Topics     Alcohol use: Yes     Comment: rarely; advised to hold 24hrs prior to DOS      Wt Readings from Last 1 Encounters:   23 70.3 kg (155 lb)        Anesthesia Evaluation            ROS/MED HX  ENT/Pulmonary:       Neurologic:       Cardiovascular:       METS/Exercise Tolerance:     Hematologic:       Musculoskeletal:       GI/Hepatic: Comment: H/o pancreatitis      Renal/Genitourinary:       Endo:       Psychiatric/Substance Use:     (+) psychiatric history depression     Infectious Disease:       Malignancy:       Other:            Physical Exam    Airway        Mallampati: I       Respiratory Devices and Support         Dental           Cardiovascular          Rhythm and rate: regular     Pulmonary           breath sounds clear to auscultation           OUTSIDE LABS:  CBC:   Lab Results   Component Value Date    WBC 6.1 2009    WBC 6.9 2008    WBC 9.6 2008    HGB 14.7 (H) 2009    HGB 15.5 (H) 2008    HGB 13.1 2008    HCT 42.1 2009    HCT 46.3 (H) 2008    HCT 38.2 2008     2009     2008     2008     BMP: No results found for: NA, POTASSIUM, CHLORIDE, CO2, BUN, CR, GLC  COAGS: No results found for: PTT, INR, FIBR  POC: No results found for: BGM, HCG, HCGS  HEPATIC: No results found for: ALBUMIN, PROTTOTAL, ALT, AST, GGT,  ALKPHOS, BILITOTAL, BILIDIRECT, VIKA  OTHER: No results found for: PH, LACT, A1C, GEORGE, PHOS, MAG, LIPASE, AMYLASE, TSH, T4, T3, CRP, SED    Anesthesia Plan    ASA Status:  1   NPO Status:  NPO Appropriate    Anesthesia Type: General.     - Airway: LMA   Induction: Intravenous.   Maintenance: TIVA.        Consents    Anesthesia Plan(s) and associated risks, benefits, and realistic alternatives discussed. Questions answered and patient/representative(s) expressed understanding.    - Discussed:     - Discussed with:  Patient         Postoperative Care    Pain management: Oral pain medications, IV analgesics, Multi-modal analgesia.   PONV prophylaxis: Dexamethasone or Solumedrol, Ondansetron (or other 5HT-3)     Comments:                Calin Mcmullen MD

## 2023-07-07 NOTE — ANESTHESIA POSTPROCEDURE EVALUATION
Patient: Sergio Burgos    Procedure: Procedure(s):  MASTECTOMY, BILATERAL, SIMPLE, with nipple grafts. OnQ       Anesthesia Type:  General    Note:  Disposition: Outpatient   Postop Pain Control: Uneventful            Sign Out: Well controlled pain   PONV: No   Neuro/Psych: Uneventful            Sign Out: Acceptable/Baseline neuro status   Airway/Respiratory: Uneventful            Sign Out: Acceptable/Baseline resp. status   CV/Hemodynamics: Uneventful            Sign Out: Acceptable CV status; No obvious hypovolemia; No obvious fluid overload   Other NRE: NONE   DID A NON-ROUTINE EVENT OCCUR? No           Last vitals:  Vitals Value Taken Time   /80 07/07/23 1638   Temp 36.4  C (97.5  F) 07/07/23 1630   Pulse 96 07/07/23 1640   Resp 8 07/07/23 1640   SpO2 97 % 07/07/23 1640   Vitals shown include unvalidated device data.    Electronically Signed By: Mitesh Roblero MD  July 7, 2023  4:54 PM

## 2023-07-07 NOTE — DISCHARGE INSTRUCTIONS
Caring for Your Wiley-Pierce Drain    You have been discharged with a Wiley-Pierce drainage tube. This tube drains fluid from your incision, helping prevent swelling and reducing the risk for infection. The tube is held in place by a few stitches. The drain will be removed when your doctor determines you no longer need it and when the amount of drainage decreases. The color and amount of fluid varies. Right after surgery, the fluid may be bright red and may become clearer over time.   Dressing:  Keep the skin around the tube dry.  If you have a dressing, change it every day.   Wash your hands.  Remove the old bandage. Do not use scissors-you may accidentally cut the tube.  Check for any redness, swelling, drainage, or broken stitches. (Call your doctor with any of these findings).  Wash your hands again.  Wet a cotton swab (Q-tip) and clean around the incision and the tube site. Use normal saline solution (salt and water) or soap and water. Start at the tube site and move outward in a circular motion.   Pat dry.  Put a new bandage on the incision and tube site. Make the bandage large enough to cover the whole incision area.   Tape the bandage in place.  Throw out old materials and wash your hands.   Home Care:  Tape the tube to the skin below the bandage. Make sure to keep some slack in the tube to keep it from pulling out.   DO NOT sleep on the same side as the tube.  Secure the tube and bulb inside your clothing with a safety pin. This helps keep the tube from being pulled out.   Keep the bulb compressed at all times, except when you empty it.  Empty your drain at least twice a day. Empty it more often if the drain is full.   Lift the opening of the drain.  Drain the fluid into a measuring cup.  Record the amount of fluid each time you empty. Share the information with your doctor at your follow-up visit.   Squeeze the bulb with your hands until you hear air coming out of the bulb.  Close the opening.   Tape  plastic wrap over the bandage and tube site when you shower.    Stripping  the tube helps keep blood clots from blocking the tube.                         ONLY DO THIS IF YOUR DOCTOR INSTRUCTED YOU TO DO SO!  Hold the tubing where it leaves the skin with one hand. This keeps it from pulling on the skin.  Pinch the tubing with the thumb and first finger of your other hand.   Slowly and firmly pull your thumb and first finger down the tube (squeezing the tube between your fingers). Keep squeezing the tube as you run your fingers towards the bulb. If the pulling hurts or feels like it is coming out of the skin, STOP. Begin again more gently.  Let go of the tubing with both hands. If the tube is still blocked, repeat these steps three or four times. Make sure that the bulb is compressed so it creates suction.  When to call your doctor:  New or increased pain around the tube  Redness, warmth, or swelling around the incision or tube  Drainage that is foul smelling  Vomiting  Fever over 101 F degrees  Fluid leaking around the tube  Incision seems not to be healing  Stitches become loose  The tube falls out  Drainage that changes from light pick to dark red  A sudden increase or decrease in the amount of drainage (over 30 ml).  Your drainage record:  Date Time Bulb 1: Amount of drainage (ml or cc) Bulb 2: Amount of drainage (ml or cc) Notes                                                                                              ON-Q  C-bloc Continuous Nerve Block Discharge Instructions    The Nerve Block    Your anesthesiologist performed a nerve block (a procedure that blocks pain to only a specific area) by inserting a small tube in your body.  This tube is connected to a pump that will help control your pain.  The pump is shaped like a balloon and is filled with medicine that causes numbness or loss of sensation to help control your pain around the incision or site of injury.  The pain pump contains local anesthetic  and DOES NOT contain opioids.  The medicine in the pump may alter your ability to feel changes in temperature or pressure.  Your doctor will tell you if any special precautions apply to you.  The Pump    DO NOT SQUEEZE THE PUMP.   The pump delivers medicine at a very slow rate.  You will NOT see the medicine moving through the tubing.  As the medicine is delivered, the pump ball will slowly become smaller.  It may take a day or so before you notice a change in the size and look of the pump.  Depending on the size of your pump, it may take 2 - 5 days to give all the medicine.  The middle part of the pump may look like an apple core when empty.      Photo used with permission from [employer].    Managing Your Pain    The Medicine and Infusion Rate is:  ______________________________    The continuous nerve block infusion may not block all of the pain from your surgery so it is important that you take the pain medicines prescribed by your surgeon if you need them.  If you continue to have difficulty with your pain control, please contact the anesthesiologist.    Caring for Your Pump at Home    Wear the pump on the outside of clothing - away from your skin and cold therapy (ice packs).  The delivery rate is accurate only at room temperature.  Make sure the white clamp on the tubing remains open (moves freely on the tubing).  Make sure there are no kinks in the tubing.  Do not tape or cover up the filter.  Protect the pump from sunlight and heat.  When sleeping:   Do not place the pump underneath the bed covers where the pump may become too warm.  DO NOT place the pump on the floor or hang the pump on a bed post as these situations may cause the tubing to get tangled and get pulled out.  Bathing/Showering:    We recommend taking sponge baths until the pump is removed.  It is important to not get the area where the tube enters your body wet.  It is normal for a small amount of fluid to leak from the hole in your body  where the tube is inserted.  If this occurs, reinforce the bandage with another bandage.  The Infusion  Do not turn the infusion off unless your anesthesiologist has told you to do so.  Do not change the flow rate of the infusion unless your anesthesiologist told you to do so.  Changing the flow rate without your doctor s instruction may result in the wrong dose of medicine, which could cause serious injury.  If your anesthesiologist told you to change the rate of your infusion:  Flip open the clear cover.  Turn the white key on the select-a-flow dial to the instructed rate.  (The rate of the infusion should line up with the black arrow at the top of the controller.)    Listen for the click when you move the dial.    Photo used with permission from [employer].    Removing the Tubing  When the pump is empty or if you have been told to stop the infusion you can remove the tubing.  Follow these steps:  Wash your hands.  Clamp the tubing (squeeze the white clamp until you hear or feel a click).  Remove the clear dressing that covers the tubing.  Grasp the tubing close to the skin and gently pull.  If you meet resistance, STOP pulling and page or call your anesthesiologist.  DO NOT cut or forcefully remove the tubing.  After removal, check the end of the tubing for a dark tip.  If you DO NOT see a dark tip, contact your anesthesiologist.  Apply firm pressure over the site until oozing stops.  Wash the area with soap and water, dry with a clean towel and then cover with a bandage.  The pump is not reusable or refillable.  Dispose of it in the trash and wash your hands.   Troubleshooting  Tubing Comes Out From Skin:  If the tube accidentally comes out, check the end of the tubing for a dark tip.  If you see a dark tip simply discard it and use the pain pills prescribed to you by your surgeon.  If you DON T see a dark tip, contact your anesthesiologist.  Tubing Disconnection:  If the tubing accidentally becomes  "disconnected from the pump, DO NOT reconnect the pump to the tubing.  It may have been contaminated with germs.  Close the tubing clamp and immediately contact your anesthesiologist.  Fluid Leaking:  If enough fluid is leaking from the pump or the tubing outside your body to soak through the dressing, close the white clamp on the tubing and contact your anesthesiologist.    Immediately report the following to your anesthesiologist:  Redness, warmth, swelling, or tenderness at the site the tubing was inserted  Increase in pain  Fever, chills, sweats  Bowel or bladder changes  Difficulty breathing  Dizziness, lightheadedness  Blurred vision  Ringing or buzzing in your ears  Metal taste in your mouth  Numbness and/or tingling around your mouth, fingers or toes  Drowsiness  Confusion  Trouble removing the tubing  Dark tip is not present when tubing is removed      Contact information for notifying your Anesthesiologist  CALL the [facility] at:  [phone].  Press \"4\" for the  and let the hospital  know that you are having a problem with a nerve block and that you would like to page the \"adult care inpatient pain management/[facility] team\".  From 7 am - 5 pm, page the \"staff\" physician  From 5 pm - 7 am, page the \"resident\" physician (if no response from \"resident\" physician, call the  back and the \"staff\" physician).             [facility]  Home Care Following Anesthesia  For 24 hours after surgery:  Get plenty of rest.  A responsible adult must stay with you for at least 24 hours after you leave the surgery center.  Do not drive or use heavy equipment.  If you have weakness or tingling, don't drive or use heavy equipment until this feeling goes away.   Do not drink alcohol.   Avoid strenuous or risky activities.  Ask for help when climbing stairs.  You may feel lightheaded.  IF so, sit for a few minutes before standing.  Have " "someone help you get up.   If you have nausea (feel sick to your stomach): Drink only clear liquids such as apple juice, ginger ale, broth or 7-Up.  Rest may also help.  Be sure to drink enough fluids.  Move to a regular diet as you feel able.   You may have a slight fever.  Call the doctor if your fever is over 100 F (37.7 C) (taken under the tongue) or lasts longer than 24 hours.  You may have a dry mouth, a sore throat, muscle aches or trouble sleeping. These should go away after 24 hours.  Do not make important or legal decisions.   It is recommended to avoid smoking.   Today you received a Marcaine or bupivacaine block to numb the nerves near your surgery site.  This is a block using local anesthetic or \"numbing\" medication injected around the nerves to anesthetize or \"numb\" the area supplied by those nerves.  This block is injected into the muscle layer near your surgical site.  The medication may numb the location where you had surgery for 6-18 hours, but may last up to 24 hours.  If your surgical site is an arm or leg you should be careful with your affected limb, since it is possible to injure your limb without being aware of it due to the numbing.  Until full feeling returns, you should guard against bumping or hitting your limb, and avoid extreme hot or cold temperatures on the skin.  As the block wears off, the feeling will return as a tingling or prickly sensation near your surgical site.  You will experience more discomfort from your incision as the feeling returns.  You may want to take a pain pill (a narcotic or Tylenol if this was prescribed by your surgeon) when you start to experience mild pain before the pain beccomes more severe.  If your pain medications do not control your pain you should notifiy your surgeon.    Tips for taking pain medications  To get the best pain relief possible, remember these points:  Take pain medications as directed, before pain becomes severe.  Pain medication can " upset your stomach: taking it with food may help.  Constipation is a common side effect of pain medication. Drink plenty of  fluids.  Eat foods high in fiber. Take a stool softener if recommended by your doctor or pharmacist.  Do not drink alcohol, drive or operate machinery while taking pain medications.  Ask about other ways to control pain, such as with heat, ice or relaxation.    Tylenol/Acetaminophen Consumption    If you feel your pain relief is insufficient, you may take Tylenol/Acetaminophen in addition to your narcotic pain medication.   Be careful not to exceed 4,000 mg of Tylenol/Acetaminophen in a 24 hour period from all sources.  If you are taking extra strength Tylenol/acetaminophen (500 mg), the maximum dose is 8 tablets in 24 hours.  If you are taking regular strength acetaminophen (325 mg), the maximum dose is 12 tablets in 24 hours.    Call a doctor for any of the following:  Signs of infection (fever, growing tenderness at the surgery site, a large amount of drainage or bleeding, severe pain, foul-smelling drainage, redness, swelling).  It has been over 8 to 10 hours since surgery and you are still not able to urinate (pass water).  Headache for over 24 hours.  Numbness, tingling or weakness the day after surgery (if you had spinal anesthesia).  Signs of Covid-19 infection (temperature over 100 degrees, shortness of breath, cough, loss of taste/smell, generalized body aches, persistent headache, chills, sore throat, nausea/vomiting/diarrhea)  Your doctor is:  Dr. Bria Phelps, Plastic Surgery: [phone]                    Or dial [phone] and ask for the resident on call for:  Plastics  For emergency care, call the:  [facility]:  [phone] (TTY for hearing impaired: [phone])

## 2023-07-07 NOTE — CONFIDENTIAL NOTE
Called pt to follow up after receiving a [place] message asking when to arrive for surgery today. Spoke with pt who had just checked in for surgery and has no other questions or needs at this time.

## 2023-07-07 NOTE — BRIEF OP NOTE
[facility]    Brief Operative Note    Pre-operative diagnosis: Gender dysphoria in adult [F64.0]  Post-operative diagnosis Same as pre-operative diagnosis    Procedure: Procedure(s):  MASTECTOMY, BILATERAL, SIMPLE, with nipple grafts. OnQ  Surgeon: Surgeon(s) and Role:     * Bria Phelps MD - Primary     * Nomi Linder MD - Resident - Assisting     * Mya Almonte PA-C  Anesthesia: General   Estimated Blood Loss: 50 ml    Drains: 2x Wiley-Pierce  Specimens:   ID Type Source Tests Collected by Time Destination   1 : LEFT BREAST TISSUE Mastectomy, Simple Breast, Left SURGICAL PATHOLOGY EXAM Bria Phelps MD 7/7/2023  2:54 PM    2 : RIGHT BREAST TISSUE Mastectomy, Simple Breast, Right SURGICAL PATHOLOGY EXAM Bria Phelps MD 7/7/2023  2:55 PM      Findings:   527g removed from the right breast and 612g removed from the left breast.    Complications: None.   Implants: * No implants in log *

## 2023-07-07 NOTE — ANESTHESIA CARE TRANSFER NOTE
Patient: Sergio Burgos    Procedure: Procedure(s):  MASTECTOMY, BILATERAL, SIMPLE, with nipple grafts. OnQ       Diagnosis: Gender dysphoria in adult [F64.0]  Diagnosis Additional Information: No value filed.    Anesthesia Type:   General     Note:    Oropharynx: oropharynx clear of all foreign objects and spontaneously breathing  Level of Consciousness: awake  Oxygen Supplementation: room air      Dentition: dentition unchanged  Vital Signs Stable: post-procedure vital signs reviewed and stable  Report to RN Given: handoff report given  Patient transferred to: PACU  Comments: Uneventful transport   Report to RN  Exchanging well; color natl  Pt responds appropriately to command  IV patent  Lips/teeth/dentition as preop status  Questions answered    Handoff Report: Identifed the Patient, Identified the Reponsible Provider, Reviewed the pertinent medical history, Discussed the surgical course, Reviewed Intra-OP anesthesia mangement and issues during anesthesia, Set expectations for post-procedure period and Allowed opportunity for questions and acknowledgement of understanding      Vitals:  Vitals Value Taken Time   /81 07/07/23 1609   Temp 97    Pulse 104 07/07/23 1613   Resp 11 07/07/23 1613   SpO2 98 % 07/07/23 1613   Vitals shown include unvalidated device data.    Electronically Signed By: JAREN LANDA CRNA  July 7, 2023  4:13 PM

## 2023-07-07 NOTE — ANESTHESIA PROCEDURE NOTES
Airway    Staff -        CRNA: Mirna Austin APRN CRNA       Performed By: CRNA  Consent for Airway        Urgency: elective  Indications and Patient Condition       Indications for airway management: aki-procedural       Induction type:intravenous       Mask difficulty assessment: 1 - vent by mask    Final Airway Details       Final airway type: supraglottic airway    Supraglottic Airway Details        Type: LMA       Brand: I-Gel       LMA size: 4    Post intubation assessment        Placement verified by: capnometry, equal breath sounds and chest rise        Number of attempts at approach: 1       Secured with: silk tape       Ease of procedure: easy       Dentition: Intact

## 2023-07-09 NOTE — OP NOTE
OP NOTE  DOS 7/7/2023    Surgeon: Bria Phelps MD  Resident: Nomi Higuera MD PGY-2  Assistant: Mya Almonte PA-C (ELLIOT did 50% of case)  Preop diagnosis: Gender dysphoria  Postop diagnosis: Same  Procedure: Bilateral simple mastectomy with nipple grafts and On-Q catheter placement.  Anesthesia: GLMA  EBL: 50  IV fluids: 1500  Urine output: 150  Counts correct  Complications none  Drains: MAME X 2  Specimens: Right breast = 527 g, left breast = 612 g    Indications: This is a 22-year-old biological female with a diagnosis of gender dysphoria.  They met WPATH and insurance criteria for gender affirming top surgery.  Due to the breast volume and ptosis, they would require a double incision approach to their mastectomy.  They desired nipple graft reconstruction.    Procedure: The patient was seen in the preoperative wait ing area.  The operative sites were marked including: Sternal notch, sternal midline, inframammary folds, vertical line through the NAC, medial and lateral borders of the breast footplate, transverse line from the mid humerus, and transverse line at the inferior origin of the pectoralis major muscle on flexion.    Informed consent was obtained after reviewing the possible risks and complications, including but not limited to the fol lowing: Infection, bleeding, hematoma/seroma formation, poor healing, possible spitting sutures, possible dehiscence, possible partial or complete loss of nipple grafts, possible injury to surrounding musculoskeletal or neurovascular structures, possible fat necrosis, possible residual deformities or asymmetries, possible injuries to intrathoracic or intra axillary structures, possible altered sensation of the chest wall-either hypo or  hyper sensitivity, and possible anesthesia risks such as DVT, PE, cardiopulmonary arrest.    The patient was then taken to the operating room on a stretcher and transferred to the OR table in the supine position.  After general  anesthesia was administered and the patient was intubated, a Costa was placed.  Thighs and forelegs were supported on pillows and secured with padded safety straps.  The patient already had sequential sarah arash devices on the lower extremities prior to induction.  A lower Gemini hugger was placed.  Arms were secured to arm boards using 6 inch Ace wraps and positioned at 90 degrees to the table.  The patient was then put into a sitting position to make adjustments for symmetry on the table.  The chest breast area was then prepped and draped in the usual fashion using ChloraPrep.  A timeout was taken and the proper patient and procedure were  identified.    We made our inframammary fold incisions using #10 blade scalpels. We then developed our dissection plane with the cautery, leaving approximately 2 cm of subcutaneous fat along the IMF incision before beveling down to the pectoralis fascia.  The breast mound was then undermined superiorly towards the clavicle, medially towards the parasternal border, and laterally past the lateral border of the pectoralis major muscle.   This allowed us to displace the breast mound inferiorly and kody where it overlapped with the IMF incision.  The nipple areolar complex was then harvested sharply with a 10 blade.  These were kept on the back table, wrapped in normal saline gauze, and marked per side.  We then proceeded to amputate the breast mound at the level of IMF overlap.  Additional thinning of the superior skin flap was also done with the cautery to achieve a f lat contour and symmetry.  All resected breast tissue was passed off the back table to be weighed before sending to pathology for permanent histologic examination.    After our initial resection, our incisions were temporarily skin stapled and the patient was put into a sitting position.  This allowed us to make further adjustments with regard to the level and shape of our IMF incision.  These areas were then resected  and added to t he pathology specimens.  Once we were happy with the symmetry with regard to contour and level and shape of our IMF incisions, including extensions laterally for debulking dogears, we then irrigated the dissection pocket with antibiotic saline solution.  Hemostasis was achieved with the cautery.  #15 round channel drains were introduced through a separate stab wound incision laterally and secured with 3-0 nylon suture.  These were drape d along the inferior pocket.  Dual On-Q catheters 10 inches were percutaneously introduced into the dissection pocket from the epigastric region and draped along the superior pocket.  These were secured with benzoin and Tegaderm.    Closure was then initiated with 2-0 Vicryl deep sutures in the capsular and fascial layers to help offload tension.  Dermis was reapproximated with 2-0 and 3-0 Vicryl deep dermal buried sutures.  Skin wa s closed with 4-0 Vicryl running subcuticular suture.  Additional touchups for skin closure were done with 5-0 fast-absorbing gut suture.      We then turned our attention to nipple grafting.  The grafts themselves had been aggressively thinned on the back table to facilitate graft take.  Templates measuring 1.5 x 2 cm were created, and with the patient in the sitting position, were used to help localize the most aesthetically appea ling position for graft placement.  This was more lateral and inferior in keeping with the patient's desire for a masculine appearing chest.  These areas were marked and then de-epithelialized sharply with #15 scalpel.  Hemostasis was achieved with direct pressure and judicious cautery.  The excess areola from the prepared grafts was then trimmed to fit the recipient site.  We did not need to trim excess nipple.  The grafts were sec ured with 5-0 fast-absorbing gut, interrupted and running cuticular sutures.  The central nipple was anchored superiorly and inferiorly.  The graft was pie crusted with an 18-gauge  needle.  The grafts were then secured with a bolster dressing consisting of Xeroform sheets with antibiotic soaked cotton balls held in place with skin staples and 2-0 silk tie overs.  Dermabond Prineo was applied to R incisions.  The MAME drain tubing was trim med and put to bulb suction.  ABD pads were used for drain sponges and 2 Kerlix rolls were used to pad the anterior chest before wrapping the thorax circumferentially with a double long 6 inch Ace wrap for compression.      The patient was then extubated, Costa catheter was removed, and the On-Q catheters were attached to the reservoir containing 550 cc of 0.2% ropivacaine, to be delivered at 2 cc/h per catheter for the next 5 days.   The patient was transferred to a stretcher and taken to the recovery room in satisfactory condition having tolerated the procedure without difficulty or complication.  Final specimen weights: Right breast = 527 g, left breast = 612 g.

## 2023-07-14 ENCOUNTER — OFFICE VISIT (OUTPATIENT)
Dept: PLASTIC SURGERY | Facility: CLINIC | Age: 23
End: 2023-07-14
Payer: COMMERCIAL

## 2023-07-14 VITALS
OXYGEN SATURATION: 97 % | HEART RATE: 108 BPM | TEMPERATURE: 98 F | SYSTOLIC BLOOD PRESSURE: 117 MMHG | BODY MASS INDEX: 26.01 KG/M2 | HEIGHT: 65 IN | WEIGHT: 156.1 LBS | DIASTOLIC BLOOD PRESSURE: 82 MMHG

## 2023-07-14 DIAGNOSIS — F64.0 GENDER DYSPHORIA IN ADULT: Primary | ICD-10-CM

## 2023-07-14 PROCEDURE — 99024 POSTOP FOLLOW-UP VISIT: CPT | Performed by: PHYSICIAN ASSISTANT

## 2023-07-14 ASSESSMENT — PAIN SCALES - GENERAL: PAINLEVEL: NO PAIN (0)

## 2023-07-14 NOTE — PATIENT INSTRUCTIONS
Care of Nipples and Chest Incisions    Change nipple dressings once daily for 1 week. Shower with dressings in place for 1 week. After that, it is ok to shower without dressings on your nipple grafts. No DIRECT shower spray on nipple grafts for 4 weeks from your surgery date, but water running over incisions and grafts is ok.      Nipple graft dressing changes: Cut vaseline gauze to nipple size and place over nipple. Place folded white gauze over vaseline gauze and cover with plastic dressing. Do dressing changes for 1 week from your post op visit. You may then apply a thin layer of Aquaphor to the nipples until healed.     Keep compression on for 1 more week from today. Continue modified T-Mahesh arms and do not life more than 5 pounds for 2 more weeks. At 3 weeks post op you may begin to use your arms as you normally would and you can lift up to 10 pounds until your 6 week post-op appointment with Dr Phelps, when restrictions are typically lifted. Remove the tape from your incisions by 3 weeks post op. You may start moisturizing your incisions with any non-scented, non-glittered lotion 3 weeks from your surgery date. You can start scar care after the tape is removed. Any over the counter scar care is ok, we recommend silicone strips or sheets. These can be purchased on [employer] and at [employer] and [employer].     At one month post op, baseline shoulder motion should return. Full shoulder motion should return by 8 weeks.      When to call:  Sudden increase of swelling or pain on one side  Uncontrolled pain despite pain medication  Worsening of chest swelling   Separation of nipple from chest skin  Redness and warmth in chest area  Fever > 101     Contact the RN between 8-3:30 Mon-Fri with questions or concerns through my chart message via your doctor's name (most efficient) or call at [phone].   For urgent medical issues that cannot wait, call [phone] Mon-Fri 8:-4:30.     After hours, weekends or holidays, call  [phone] and ask to speak to the on call plastic surgeon fellow.

## 2023-07-14 NOTE — NURSING NOTE
"Chief Complaint   Patient presents with     Surgical Followup     1 week post op       Vitals:    07/14/23 0856   BP: 117/82   Pulse: 108   Temp: 98  F (36.7  C)   SpO2: 97%   Weight: 70.8 kg (156 lb 1.6 oz)   Height: 1.651 m (5' 5\")       Body mass index is 25.98 kg/m .      Annika Verdugo RN    "

## 2023-07-14 NOTE — LETTER
"7/14/2023       RE: Sergio Burgos  [address] [place] [address]     Dear Colleague,    Thank you for referring your patient, Sergio Burgos, to the [facility] PLASTIC AND RECONSTRUCTIVE SURGERY CLINIC [place] at [facility]. Please see a copy of my visit note below.    Plastic Surgery Outpatient Visit    ID: Sergio Burgos is a 22 year old male s/p bilateral mastectomy with nipple grafts for gender dysphoria 7/7/2023 with Dr. Phelps.     S: Doing well. Drain output <25cc daily. Pain controlled, still taking tylenol every 4-5 hours.     O:  /82   Pulse 108   Temp 98  F (36.7  C)   Ht 1.651 m (5' 5\")   Wt 70.8 kg (156 lb 1.6 oz)   SpO2 97%   BMI 25.98 kg/m     General: NAD  Chest: bilateral chest incisions c/d/i. Nipple grafts well adhered bilaterally. Mild resolving ecchymosis. No significant swelling.     PATH: pending    A/P:  -healing well  -drains removed today  -nipple dressings x1 week  -wrap x1 week  - tape off in 2 weeks.  -Trex/5lb lifting restrictions x2 more weeks.  -RTC 6 weeks with Dr. Phelps          20 minutes spent on the date of the encounter doing chart review, history and physical, dressing changes, documentation and further activity as noted above.          Again, thank you for allowing me to participate in the care of your patient.      Sincerely,    Mya Almonte PA-C      "

## 2023-07-18 LAB
PATH REPORT.COMMENTS IMP SPEC: NORMAL
PATH REPORT.COMMENTS IMP SPEC: NORMAL
PATH REPORT.FINAL DX SPEC: NORMAL
PATH REPORT.GROSS SPEC: NORMAL
PATH REPORT.MICROSCOPIC SPEC OTHER STN: NORMAL
PATH REPORT.RELEVANT HX SPEC: NORMAL
PHOTO IMAGE: NORMAL

## 2023-08-22 ENCOUNTER — OFFICE VISIT (OUTPATIENT)
Dept: PLASTIC SURGERY | Facility: CLINIC | Age: 23
End: 2023-08-22
Payer: COMMERCIAL

## 2023-08-22 VITALS — DIASTOLIC BLOOD PRESSURE: 75 MMHG | SYSTOLIC BLOOD PRESSURE: 106 MMHG | OXYGEN SATURATION: 96 % | HEART RATE: 92 BPM

## 2023-08-22 DIAGNOSIS — Z90.13 S/P BILATERAL MASTECTOMY: Primary | ICD-10-CM

## 2023-08-22 PROCEDURE — 99024 POSTOP FOLLOW-UP VISIT: CPT | Performed by: SURGERY

## 2023-08-22 ASSESSMENT — PAIN SCALES - GENERAL: PAINLEVEL: NO PAIN (0)

## 2023-08-22 NOTE — PROGRESS NOTES
"PLASTICS POST-OP  This is a 22 year old trans male(he/him) with a history of gender dysphoria who presents 7 weeks post-op after a bilateral simple mastectomy with nipple graft reconstruction on 7/7/2023. He is here today by himself.    Following surgery the first week Eladio barely used any narcotics and had good pain relief with NSAIDs and the OnQ pump. He feels that he is pretty back to normal and even standing up straighter now. His ROM is unlimited.  Sensation is returning.     PE: Chest:  Nice upper chest contour.   Good symmetry, with slightly thicker skin flap on R, including lateral. Only the tiniest of dogears bilaterally.    Scars are mildly thickened bilaterally, and there are a few wider areas where there was some spitting sutures. Some faint striae on R lateral.   Nipples well-healed, with more pigmentation and nipple definition on L.   Photos taken with verbal consent.     A&P: 22 year old trans male (shirley/him) who presents 7 weeks post-op after a bilateral simple mastectomy with nipple graft conservation on 7/7/2023.     Overall Sergio is healing well. He can gradually increase activity as tolerated. ROM today is at 99 % with minimal lateral tightness.    We discussed scar reducing techniques, such as Mederma, silicone strips, vitamin E oil, emu oil, massage and when he may begin using these. For scar care, at home Sergio has been using vaseline for the nipple grafts and silicone tape for the incisions x 8 days/wear time.    Eladio is very pleased with their results and accepting of the slight asymmetries. \"Its been great when choosing shirts to wear now\" and his dysphoria is much improved.     For activities, Eladio would like to start using weights again, as well as bicycling, swimming and paddle boarding. He was encouraged to ramp up gradually over the next 2 weeks for most of these. Submerging under water is OK.     He will follow up 6-12 months post-op. Causes for returning sooner were discussed. "     Total time = 20 minutes, spent on the date of encounter doing chart review, history and physical, dressing changes, documentation, patient education, and any further activity as noted above.     This note was prepared on behalf of Bria Phelps MD by Rina Garcia (they/them), a trained medical scribe, based on my observations and the provider's statements to me.

## 2023-08-22 NOTE — LETTER
"8/22/2023       RE: Sergio Burgos  [address] [place] [address]     Dear Colleague,    Thank you for referring your patient, Sergio Burgos, to the [facility] PLASTIC AND RECONSTRUCTIVE SURGERY CLINIC [place] at [facility]. Please see a copy of my visit note below.    PLASTICS POST-OP  This is a 22 year old trans male(he/him) with a history of gender dysphoria who presents 7 weeks post-op after a bilateral simple mastectomy with nipple graft reconstruction on 7/7/2023. He is here today by himself.    Following surgery the first week Eladio barely used any narcotics and had good pain relief with NSAIDs and the OnQ pump. He feels that he is pretty back to normal and even standing up straighter now. His ROM is unlimited.  Sensation is returning.     PE: Chest:  Nice upper chest contour.   Good symmetry, with slightly thicker skin flap on R, including lateral. Only the tiniest of dogears bilaterally.    Scars are mildly thickened bilaterally, and there are a few wider areas where there was some spitting sutures. Some faint striae on R lateral.   Nipples well-healed, with more pigmentation and nipple definition on L.   Photos taken with verbal consent.     A&P: 22 year old trans male (he/him) who presents 7 weeks post-op after a bilateral simple mastectomy with nipple graft conservation on 7/7/2023.     Overall Sergio is healing well. He can gradually increase activity as tolerated. ROM today is at 99 % with minimal lateral tightness.    We discussed scar reducing techniques, such as Mederma, silicone strips, vitamin E oil, emu oil, massage and when he may begin using these. For scar care, at home Sergio has been using vaseline for the nipple grafts and silicone tape for the incisions x 8 days/wear time.    Eladio is very pleased with their results and accepting of the slight asymmetries. \"Its been great when choosing shirts to " "wear now\" and his dysphoria is much improved.     For activities, Eladio would like to start using weights again, as well as bicycling, swimming and paddle boarding. He was encouraged to ramp up gradually over the next 2 weeks for most of these. Submerging under water is OK.     He will follow up 6-12 months post-op. Causes for returning sooner were discussed.     Total time = 20 minutes, spent on the date of encounter doing chart review, history and physical, dressing changes, documentation, patient education, and any further activity as noted above.     This note was prepared on behalf of Bria Phelps MD by Rina Garcia (they/them), a trained medical scribe, based on my observations and the provider's statements to me.         Again, thank you for allowing me to participate in the care of your patient.      Sincerely,    Bria Phelps MD    "

## 2023-08-22 NOTE — NURSING NOTE
Chief Complaint   Patient presents with    Post-Op - General Surgery       Vitals:    08/22/23 0828   BP: 106/75   BP Location: Left arm   Patient Position: Sitting   Cuff Size: Adult Regular   Pulse: 92   SpO2: 96%       There is no height or weight on file to calculate BMI.    Leonel Cruz EMT-P

## 2023-12-26 ENCOUNTER — OFFICE VISIT (OUTPATIENT)
Dept: PLASTIC SURGERY | Facility: CLINIC | Age: 23
End: 2023-12-26
Payer: COMMERCIAL

## 2023-12-26 ENCOUNTER — TELEPHONE (OUTPATIENT)
Dept: PLASTIC SURGERY | Facility: CLINIC | Age: 23
End: 2023-12-26

## 2023-12-26 VITALS
SYSTOLIC BLOOD PRESSURE: 124 MMHG | OXYGEN SATURATION: 99 % | HEART RATE: 93 BPM | DIASTOLIC BLOOD PRESSURE: 77 MMHG | BODY MASS INDEX: 27.41 KG/M2 | WEIGHT: 164.5 LBS | HEIGHT: 65 IN

## 2023-12-26 DIAGNOSIS — L91.0 SCARRING, HYPERTROPHIC: Primary | ICD-10-CM

## 2023-12-26 DIAGNOSIS — Z90.13 S/P BILATERAL MASTECTOMY: ICD-10-CM

## 2023-12-26 PROCEDURE — 99213 OFFICE O/P EST LOW 20 MIN: CPT | Performed by: SURGERY

## 2023-12-26 RX ORDER — EMTRICITABINE AND TENOFOVIR DISOPROXIL FUMARATE 200; 300 MG/1; MG/1
1 TABLET, FILM COATED ORAL
COMMUNITY
Start: 2023-10-16

## 2023-12-26 RX ORDER — NEEDLES, DISPOSABLE 25GX5/8"
NEEDLE, DISPOSABLE MISCELLANEOUS
COMMUNITY
Start: 2023-10-16

## 2023-12-26 RX ORDER — TESTOSTERONE CYPIONATE 200 MG/ML
INJECTION, SOLUTION INTRAMUSCULAR
COMMUNITY
Start: 2023-10-16 | End: 2023-12-26

## 2023-12-26 RX ORDER — SYRINGE, DISPOSABLE, 1 ML
SYRINGE, EMPTY DISPOSABLE MISCELLANEOUS SEE ADMIN INSTRUCTIONS
COMMUNITY
Start: 2023-10-16

## 2023-12-26 ASSESSMENT — PAIN SCALES - GENERAL: PAINLEVEL: NO PAIN (1)

## 2023-12-26 NOTE — TELEPHONE ENCOUNTER
Called patient and LVM requesting they come in earlier for their 4 PM appointment today with Dr. Phelps.  Braulio Braun, EMT

## 2023-12-26 NOTE — PROGRESS NOTES
PLASTICS POST-OP  This is a 23 year old trans male with a history of gender dysphoria who presents 25 weeks, almost 6 months post-op after a bilateral simple mastectomy with nipple graft reconstruction on 7/7/23. He is here today by himself with concerns regarding scar thickness.     PE: Chest: Nice upper chest contour.  Patient states the incisions feel a bit tight upon movement sometimes, particularly when reaching cross body.  Slightly thickened nipples, a bit smaller on the right side. Slightly thicker skin flap on right side. L nipple has some probable inclusion cyst.   Hypertrophic scarring, with more widening laterally.   Faint striae bilaterally - old medial, new lateral.  Good symmetry.   Photos taken with verbal consent.     A&P: 23 year old trans male who presents 25 weeks post-op after a bilateral simple mastectomy with nipple graft conservation on 7/7/23.     Overall Eladio is healing well.  Patient reports intermediate itchiness on incision scars with no tenderness and some lateral tightness upon stretching, right side less than left. States he used silicone tape and silicone gel for about 3 months on his scars. Tried some massage but wasn't sure how hard to push, how to do it, etc. so he stopped. Also applies vaseline every day.    We discussed scar reducing techniques, such as Mederma, silicone strips, vitamin E oil, emu oil, massage and when he may begin using these. Discussed possibility of steroid injections to flatten incision scars and/or reduce itchiness, as well as other options for scar appearance management.     Patient states he is interested in trying the steroid injections. Also discussed inclusion cysts, per patient observation regarding nipples, and treatment methods for those.     He will follow up as needed post-op. Causes for returning sooner were discussed.     Total time = 20 minutes, spent on the date of encounter doing chart review, history and physical, dressing changes,  documentation, patient education, and any further activity as noted above.     This note was prepared on behalf of Bria Phelps MD by Nuvia Hernandez, a trained medical scribe, based on my observations and the provider's statements to me.

## 2023-12-26 NOTE — LETTER
12/26/2023       RE: Sergio Burgos  [address] [place] [address]       Dear Colleague,    Thank you for referring your patient, Sergio Burgos, to the [facility] PLASTIC AND RECONSTRUCTIVE SURGERY CLINIC [place] at [facility]. Please see a copy of my visit note below.    PLASTICS POST-OP  This is a 23 year old trans male with a history of gender dysphoria who presents 25 weeks, almost 6 months post-op after a bilateral simple mastectomy with nipple graft reconstruction on 7/7/23. He is here today by himself with concerns regarding scar thickness.     PE: Chest: Nice upper chest contour.  Patient states the incisions feel a bit tight upon movement sometimes, particularly when reaching cross body.  Slightly thickened nipples, a bit smaller on the right side. Slightly thicker skin flap on right side. L nipple has some probable inclusion cyst.   Hypertrophic scarring, with more widening laterally.   Faint striae bilaterally - old medial, new lateral.  Good symmetry.   Photos taken with verbal consent.     A&P: 23 year old trans male who presents 25 weeks post-op after a bilateral simple mastectomy with nipple graft conservation on 7/7/23.     Overall Eladio is healing well.  Patient reports intermediate itchiness on incision scars with no tenderness and some lateral tightness upon stretching, right side less than left. States he used silicone tape and silicone gel for about 3 months on his scars. Tried some massage but wasn't sure how hard to push, how to do it, etc. so he stopped. Also applies vaseline every day.    We discussed scar reducing techniques, such as Mederma, silicone strips, vitamin E oil, emu oil, massage and when he may begin using these. Discussed possibility of steroid injections to flatten incision scars and/or reduce itchiness, as well as other options for scar appearance management.     Patient states he is  interested in trying the steroid injections. Also discussed inclusion cysts, per patient observation regarding nipples, and treatment methods for those.     He will follow up as needed post-op. Causes for returning sooner were discussed.     Total time = 20 minutes, spent on the date of encounter doing chart review, history and physical, dressing changes, documentation, patient education, and any further activity as noted above.     This note was prepared on behalf of Bria Phelps MD by Nuvia Hernandez, a trained medical scribe, based on my observations and the provider's statements to me.          Again, thank you for allowing me to participate in the care of your patient.      Sincerely,    Bria Phelps MD

## 2023-12-26 NOTE — NURSING NOTE
"Chief Complaint   Patient presents with    Surgical Followup     6 month post-op, DOS 7/7/23.       Vitals:    12/26/23 1547   BP: 124/77   BP Location: Left arm   Patient Position: Sitting   Cuff Size: Adult Regular   Pulse: 93   SpO2: 99%   Weight: 74.6 kg (164 lb 8 oz)   Height: 1.651 m (5' 5\")       Body mass index is 27.37 kg/m .    Patient reports mild pain (1/10) along axillary scars with movement.    Braulio Braun, EMT    "

## 2023-12-27 DIAGNOSIS — L91.0 SCARRING, HYPERTROPHIC: Primary | ICD-10-CM

## 2023-12-27 RX ORDER — LIDOCAINE/PRILOCAINE 2.5 %-2.5%
CREAM (GRAM) TOPICAL PRN
Qty: 30 G | Refills: 3 | Status: SHIPPED | OUTPATIENT
Start: 2023-12-27

## 2024-01-10 NOTE — PROGRESS NOTES
Plastic Surgery Outpatient Visit    ID: Sergio Garrett Burgos is a 23 year old male s/p gender affirming mastectomy with nipple grafting 7/2023 with Dr. Phelps, now with concerns about hypertrophic scarring.     S: notes thickened scars bilaterally to chest. Endorses itching but not significant tenderness.     O:  /68 (BP Location: Left arm, Patient Position: Sitting, Cuff Size: Adult Regular)   Pulse 97   Temp 98.1  F (36.7  C) (Oral)   Wt 73.6 kg (162 lb 4.8 oz)   SpO2 98%   BMI 27.01 kg/m     General: NAD  Chest: bilateral chest scars thick, pink. Medially scars are thicker, laterally slightly think and wider.     A/P:  -hypertrophic scars bilaterally, patient agreeable to treatment with ILK.   -risks and benefits of kenalog injection discussed including but not limited to blood sugar issues, hypopigmentation and thinning of skin. Patient agreeable and consent signed.   -2cc kenalog 40 mixed 1:1 with 1% lidocaine WITHOUT epi for a 20mg/ml kenalog dilution. Chest scars cleaned with alcohol wipes. Injections applied to thickened areas of scar. 2cc to R and 2cc to L. Patient tolerated well.    -RTC ~6 weeks    Mya Almonte PA-C  Plastic and Reconstructive Surgery    25 minutes spent on the date of the encounter doing chart review, history and physical, dressing changes, documentation and further activity as noted above.

## 2024-01-12 ENCOUNTER — OFFICE VISIT (OUTPATIENT)
Dept: PLASTIC SURGERY | Facility: CLINIC | Age: 24
End: 2024-01-12
Payer: COMMERCIAL

## 2024-01-12 VITALS
TEMPERATURE: 98.1 F | WEIGHT: 162.3 LBS | BODY MASS INDEX: 27.01 KG/M2 | OXYGEN SATURATION: 98 % | DIASTOLIC BLOOD PRESSURE: 68 MMHG | SYSTOLIC BLOOD PRESSURE: 126 MMHG | HEART RATE: 97 BPM

## 2024-01-12 DIAGNOSIS — L91.0 SCARRING, HYPERTROPHIC: Primary | ICD-10-CM

## 2024-01-12 PROCEDURE — 11900 INJECT SKIN LESIONS </W 7: CPT | Performed by: PHYSICIAN ASSISTANT

## 2024-01-12 RX ORDER — TRIAMCINOLONE ACETONIDE 40 MG/ML
40 INJECTION, SUSPENSION INTRA-ARTICULAR; INTRAMUSCULAR ONCE
Status: COMPLETED | OUTPATIENT
Start: 2024-01-12 | End: 2024-01-12

## 2024-01-12 RX ADMIN — TRIAMCINOLONE ACETONIDE 40 MG: 40 INJECTION, SUSPENSION INTRA-ARTICULAR; INTRAMUSCULAR at 09:36

## 2024-01-12 ASSESSMENT — PAIN SCALES - GENERAL: PAINLEVEL: MILD PAIN (2)

## 2024-01-12 NOTE — LETTER
1/12/2024       RE: Sergio Burgos  [address] [place] [address]     Dear Colleague,    Thank you for referring your patient, Sergio Burgos, to the [facility] PLASTIC AND RECONSTRUCTIVE SURGERY CLINIC [place] at [facility]. Please see a copy of my visit note below.    Plastic Surgery Outpatient Visit    ID: Sergio Burgos is a 23 year old male s/p gender affirming mastectomy with nipple grafting 7/2023 with Dr. Phelps, now with concerns about hypertrophic scarring.     S: notes thickened scars bilaterally to chest. Endorses itching but not significant tenderness.     O:  /68 (BP Location: Left arm, Patient Position: Sitting, Cuff Size: Adult Regular)   Pulse 97   Temp 98.1  F (36.7  C) (Oral)   Wt 73.6 kg (162 lb 4.8 oz)   SpO2 98%   BMI 27.01 kg/m     General: NAD  Chest: bilateral chest scars thick, pink. Medially scars are thicker, laterally slightly think and wider.     A/P:  -hypertrophic scars bilaterally, patient agreeable to treatment with ILK.   -risks and benefits of kenalog injection discussed including but not limited to blood sugar issues, hypopigmentation and thinning of skin. Patient agreeable and consent signed.   -2cc kenalog 40 mixed 1:1 with 1% lidocaine WITHOUT epi for a 20mg/ml kenalog dilution. Chest scars cleaned with alcohol wipes. Injections applied to thickened areas of scar. 2cc to R and 2cc to L. Patient tolerated well.    -RTC ~6 weeks      25 minutes spent on the date of the encounter doing chart review, history and physical, dressing changes, documentation and further activity as noted above.      Again, thank you for allowing me to participate in the care of your patient.      Sincerely,    Mya Almonte PA-C

## 2024-01-12 NOTE — NURSING NOTE
Chief Complaint   Patient presents with    RECHECK     Hypertrophic scar treatment.       Vitals:    01/12/24 0910   BP: 126/68   BP Location: Left arm   Patient Position: Sitting   Cuff Size: Adult Regular   Pulse: 97   Temp: 98.1  F (36.7  C)   TempSrc: Oral   SpO2: 98%   Weight: 73.6 kg (162 lb 4.8 oz)       Body mass index is 27.01 kg/m .    Patient reports mild pain along chest incision (2/10).    Braulio Braun, EMT

## 2024-03-01 NOTE — PROGRESS NOTES
"Plastic Surgery Outpatient Visit    ID:  Sergio Garrett Burgos is a 23 year old male s/p gender affirming mastectomy with nipple grafting 7/2023 with Dr. Phelps, now with concerns about hypertrophic scarring, s/p ILK injection 1/12.    S: Eladio is doing ok since last visit but he noticed to left axillary scar widened since the steroids. The itching temporarily improved but has returned and is bothersome. The scars are tender to the touch but don't cause pain if not being touched.     O:  /73 (BP Location: Left arm, Patient Position: Sitting, Cuff Size: Adult Regular)   Pulse 95   Ht 1.651 m (5' 5\")   Wt 73.4 kg (161 lb 12.8 oz)   SpO2 97%   BMI 26.92 kg/m     General: NAD  Chest: bilateral chest scars lighter purple color, anterior scars hypertrophied. L lateral scar is widened and thin. Mild widening of R lateral scar. Overall good contour, R slightly thicker than L laterally. Skin soft, not tight.     A/P:  -persistent symptomatic keloids/hypertrophic scars s/p ILK x1.  -discussed further steroid injections, not interested at this time due to risks of thinning/widening of scar  -discussed scar revision for symptomatic keloid scars. This was discussed with Dr. Phelps. His only concern is scars, no issues with any contour differences.   -he will consider surgical revision. Timing depending on semester abroad this fall. Will also consider what this may cost through his insurance.     Mya Almonte PA-C  Plastic and Reconstructive Surgery    25 minutes spent on the date of the encounter doing chart review, history and physical, dressing changes, documentation and further activity as noted above.    " DISPLAY PLAN FREE TEXT

## 2024-03-05 ENCOUNTER — OFFICE VISIT (OUTPATIENT)
Dept: PLASTIC SURGERY | Facility: CLINIC | Age: 24
End: 2024-03-05
Payer: COMMERCIAL

## 2024-03-05 VITALS
SYSTOLIC BLOOD PRESSURE: 107 MMHG | BODY MASS INDEX: 26.96 KG/M2 | OXYGEN SATURATION: 97 % | DIASTOLIC BLOOD PRESSURE: 73 MMHG | WEIGHT: 161.8 LBS | HEART RATE: 95 BPM | HEIGHT: 65 IN

## 2024-03-05 DIAGNOSIS — L91.0 SCARRING, HYPERTROPHIC: Primary | ICD-10-CM

## 2024-03-05 DIAGNOSIS — F64.0 GENDER DYSPHORIA IN ADULT: ICD-10-CM

## 2024-03-05 PROCEDURE — 99213 OFFICE O/P EST LOW 20 MIN: CPT | Performed by: PHYSICIAN ASSISTANT

## 2024-03-05 ASSESSMENT — PAIN SCALES - GENERAL: PAINLEVEL: NO PAIN (0)

## 2024-03-05 NOTE — NURSING NOTE
"Chief Complaint   Patient presents with    RECHECK     8 week follow-up, hypertrophic scar treatment.       Vitals:    03/05/24 0934   BP: 107/73   BP Location: Left arm   Patient Position: Sitting   Cuff Size: Adult Regular   Pulse: 95   SpO2: 97%   Weight: 161 lb 12.8 oz   Height: 5' 5\"       Body mass index is 26.92 kg/m .    Braulio Braun, EMT    "

## 2024-03-05 NOTE — LETTER
"3/5/2024       RE: Sergio Burgos  [address] [place] [address]     Dear Colleague,    Thank you for referring your patient, Sergio Burgos, to the [facility] PLASTIC AND RECONSTRUCTIVE SURGERY CLINIC [place] at [facility]. Please see a copy of my visit note below.    Plastic Surgery Outpatient Visit    ID:  Sergio Burgos is a 23 year old male s/p gender affirming mastectomy with nipple grafting 7/2023 with Dr. Phelps, now with concerns about hypertrophic scarring, s/p ILK injection 1/12.    S: Eladio is doing ok since last visit but he noticed to left axillary scar widened since the steroids. The itching temporarily improved but has returned and is bothersome. The scars are tender to the touch but don't cause pain if not being touched.     O:  /73 (BP Location: Left arm, Patient Position: Sitting, Cuff Size: Adult Regular)   Pulse 95   Ht 1.651 m (5' 5\")   Wt 73.4 kg (161 lb 12.8 oz)   SpO2 97%   BMI 26.92 kg/m     General: NAD  Chest: bilateral chest scars lighter purple color, anterior scars hypertrophied. L lateral scar is widened and thin. Mild widening of R lateral scar. Overall good contour, R slightly thicker than L laterally. Skin soft, not tight.     A/P:  -persistent symptomatic keloids/hypertrophic scars s/p ILK x1.  -discussed further steroid injections, not interested at this time due to risks of thinning/widening of scar  -discussed scar revision for symptomatic keloid scars. This was discussed with Dr. Phelps. His only concern is scars, no issues with any contour differences.   -he will consider surgical revision. Timing depending on semester abroad this fall. Will also consider what this may cost through his insurance.     Mya Almonte PA-C  Plastic and Reconstructive Surgery    25 minutes spent on the date of the encounter doing chart review, history and physical, dressing changes, " documentation and further activity as noted above.      Again, thank you for allowing me to participate in the care of your patient.      Sincerely,    Mya Almonte PA-C

## 2024-06-12 ENCOUNTER — TRANSFERRED RECORDS (OUTPATIENT)
Dept: MULTI SPECIALTY CLINIC | Facility: CLINIC | Age: 24
End: 2024-06-12

## 2024-06-12 LAB — PAP SMEAR - HIM PATIENT REPORTED: NORMAL

## 2024-06-23 ENCOUNTER — HEALTH MAINTENANCE LETTER (OUTPATIENT)
Age: 24
End: 2024-06-23

## 2025-03-25 SDOH — HEALTH STABILITY: PHYSICAL HEALTH: ON AVERAGE, HOW MANY MINUTES DO YOU ENGAGE IN EXERCISE AT THIS LEVEL?: 20 MIN

## 2025-03-25 SDOH — HEALTH STABILITY: PHYSICAL HEALTH: ON AVERAGE, HOW MANY DAYS PER WEEK DO YOU ENGAGE IN MODERATE TO STRENUOUS EXERCISE (LIKE A BRISK WALK)?: 3 DAYS

## 2025-03-25 ASSESSMENT — PATIENT HEALTH QUESTIONNAIRE - PHQ9: SUM OF ALL RESPONSES TO PHQ QUESTIONS 1-9: 13

## 2025-03-25 ASSESSMENT — SOCIAL DETERMINANTS OF HEALTH (SDOH): HOW OFTEN DO YOU GET TOGETHER WITH FRIENDS OR RELATIVES?: TWICE A WEEK

## 2025-03-26 ENCOUNTER — OFFICE VISIT (OUTPATIENT)
Dept: FAMILY MEDICINE | Facility: CLINIC | Age: 25
End: 2025-03-26
Payer: COMMERCIAL

## 2025-03-26 VITALS
RESPIRATION RATE: 18 BRPM | BODY MASS INDEX: 26.92 KG/M2 | HEART RATE: 100 BPM | DIASTOLIC BLOOD PRESSURE: 81 MMHG | SYSTOLIC BLOOD PRESSURE: 121 MMHG | TEMPERATURE: 97.8 F | HEIGHT: 66 IN | OXYGEN SATURATION: 98 % | WEIGHT: 167.5 LBS

## 2025-03-26 DIAGNOSIS — F33.1 MAJOR DEPRESSIVE DISORDER, RECURRENT EPISODE, MODERATE (H): Primary | ICD-10-CM

## 2025-03-26 DIAGNOSIS — Z13.21 SCREENING FOR ENDOCRINE, NUTRITIONAL, METABOLIC AND IMMUNITY DISORDER: ICD-10-CM

## 2025-03-26 DIAGNOSIS — F33.1 MAJOR DEPRESSIVE DISORDER, RECURRENT EPISODE, MODERATE (H): ICD-10-CM

## 2025-03-26 DIAGNOSIS — Z11.4 SCREENING FOR HIV (HUMAN IMMUNODEFICIENCY VIRUS): Primary | ICD-10-CM

## 2025-03-26 DIAGNOSIS — Z11.4 SCREENING FOR HIV (HUMAN IMMUNODEFICIENCY VIRUS): ICD-10-CM

## 2025-03-26 DIAGNOSIS — Z13.0 SCREENING FOR ENDOCRINE, NUTRITIONAL, METABOLIC AND IMMUNITY DISORDER: ICD-10-CM

## 2025-03-26 DIAGNOSIS — Z13.29 SCREENING FOR ENDOCRINE, NUTRITIONAL, METABOLIC AND IMMUNITY DISORDER: ICD-10-CM

## 2025-03-26 DIAGNOSIS — Z13.228 SCREENING FOR ENDOCRINE, NUTRITIONAL, METABOLIC AND IMMUNITY DISORDER: ICD-10-CM

## 2025-03-26 DIAGNOSIS — Z11.59 NEED FOR HEPATITIS C SCREENING TEST: ICD-10-CM

## 2025-03-26 DIAGNOSIS — L71.0 PERIORAL DERMATITIS: ICD-10-CM

## 2025-03-26 LAB
ALBUMIN SERPL BCG-MCNC: 4.9 G/DL (ref 3.5–5.2)
ALP SERPL-CCNC: 69 U/L (ref 40–150)
ALT SERPL W P-5'-P-CCNC: 19 U/L (ref 0–70)
ANION GAP SERPL CALCULATED.3IONS-SCNC: 13 MMOL/L (ref 7–15)
AST SERPL W P-5'-P-CCNC: 29 U/L (ref 0–45)
BASOPHILS # BLD AUTO: 0 10E3/UL (ref 0–0.2)
BASOPHILS NFR BLD AUTO: 0 %
BILIRUB SERPL-MCNC: 1.4 MG/DL
BUN SERPL-MCNC: 12.3 MG/DL (ref 6–20)
CALCIUM SERPL-MCNC: 10 MG/DL (ref 8.8–10.4)
CHLORIDE SERPL-SCNC: 99 MMOL/L (ref 98–107)
CHOLEST SERPL-MCNC: 177 MG/DL
CREAT SERPL-MCNC: 0.97 MG/DL (ref 0.51–1.17)
EGFRCR SERPLBLD CKD-EPI 2021: >90 ML/MIN/1.73M2
EOSINOPHIL # BLD AUTO: 0.2 10E3/UL (ref 0–0.7)
EOSINOPHIL NFR BLD AUTO: 2 %
ERYTHROCYTE [DISTWIDTH] IN BLOOD BY AUTOMATED COUNT: 11.5 % (ref 10–15)
FASTING STATUS PATIENT QL REPORTED: YES
FASTING STATUS PATIENT QL REPORTED: YES
GLUCOSE SERPL-MCNC: 88 MG/DL (ref 70–99)
HCO3 SERPL-SCNC: 27 MMOL/L (ref 22–29)
HCT VFR BLD AUTO: 46.8 % (ref 35–53)
HDLC SERPL-MCNC: 31 MG/DL
HGB BLD-MCNC: 16.3 G/DL (ref 11.7–17.7)
HIV 1+2 AB+HIV1 P24 AG SERPL QL IA: NONREACTIVE
IMM GRANULOCYTES # BLD: 0 10E3/UL
IMM GRANULOCYTES NFR BLD: 0 %
LDLC SERPL CALC-MCNC: 102 MG/DL
LYMPHOCYTES # BLD AUTO: 2.8 10E3/UL (ref 0.8–5.3)
LYMPHOCYTES NFR BLD AUTO: 28 %
MCH RBC QN AUTO: 29.9 PG (ref 26.5–33)
MCHC RBC AUTO-ENTMCNC: 34.8 G/DL (ref 31.5–36.5)
MCV RBC AUTO: 86 FL (ref 78–100)
MONOCYTES # BLD AUTO: 0.7 10E3/UL (ref 0–1.3)
MONOCYTES NFR BLD AUTO: 7 %
NEUTROPHILS # BLD AUTO: 6.3 10E3/UL (ref 1.6–8.3)
NEUTROPHILS NFR BLD AUTO: 62 %
NONHDLC SERPL-MCNC: 146 MG/DL
PLATELET # BLD AUTO: 317 10E3/UL (ref 150–450)
POTASSIUM SERPL-SCNC: 4 MMOL/L (ref 3.4–5.3)
PROT SERPL-MCNC: 7.7 G/DL (ref 6.4–8.3)
RBC # BLD AUTO: 5.45 10E6/UL (ref 3.8–5.9)
SODIUM SERPL-SCNC: 139 MMOL/L (ref 135–145)
TRIGL SERPL-MCNC: 219 MG/DL
TSH SERPL DL<=0.005 MIU/L-ACNC: 1.17 UIU/ML (ref 0.3–4.2)
WBC # BLD AUTO: 10.1 10E3/UL (ref 4–11)

## 2025-03-26 PROCEDURE — 3079F DIAST BP 80-89 MM HG: CPT | Performed by: FAMILY MEDICINE

## 2025-03-26 PROCEDURE — 90715 TDAP VACCINE 7 YRS/> IM: CPT | Performed by: FAMILY MEDICINE

## 2025-03-26 PROCEDURE — 99203 OFFICE O/P NEW LOW 30 MIN: CPT | Mod: 25 | Performed by: FAMILY MEDICINE

## 2025-03-26 PROCEDURE — 85025 COMPLETE CBC W/AUTO DIFF WBC: CPT | Performed by: FAMILY MEDICINE

## 2025-03-26 PROCEDURE — 3074F SYST BP LT 130 MM HG: CPT | Performed by: FAMILY MEDICINE

## 2025-03-26 PROCEDURE — 80053 COMPREHEN METABOLIC PANEL: CPT | Performed by: FAMILY MEDICINE

## 2025-03-26 PROCEDURE — G2211 COMPLEX E/M VISIT ADD ON: HCPCS | Performed by: FAMILY MEDICINE

## 2025-03-26 PROCEDURE — 36415 COLL VENOUS BLD VENIPUNCTURE: CPT | Performed by: FAMILY MEDICINE

## 2025-03-26 PROCEDURE — 80061 LIPID PANEL: CPT | Performed by: FAMILY MEDICINE

## 2025-03-26 PROCEDURE — 84443 ASSAY THYROID STIM HORMONE: CPT | Performed by: FAMILY MEDICINE

## 2025-03-26 PROCEDURE — 87389 HIV-1 AG W/HIV-1&-2 AB AG IA: CPT | Performed by: FAMILY MEDICINE

## 2025-03-26 PROCEDURE — 90471 IMMUNIZATION ADMIN: CPT | Performed by: FAMILY MEDICINE

## 2025-03-26 PROCEDURE — 96127 BRIEF EMOTIONAL/BEHAV ASSMT: CPT | Performed by: FAMILY MEDICINE

## 2025-03-26 RX ORDER — VENLAFAXINE HYDROCHLORIDE 150 MG/1
150 CAPSULE, EXTENDED RELEASE ORAL DAILY
Qty: 30 CAPSULE | Refills: 0 | Status: SHIPPED | OUTPATIENT
Start: 2025-03-26

## 2025-03-26 RX ORDER — ESTRADIOL 0.1 MG/G
CREAM VAGINAL
COMMUNITY
Start: 2024-07-22

## 2025-03-26 RX ORDER — BUPROPION HYDROCHLORIDE 150 MG/1
150 TABLET ORAL EVERY MORNING
Qty: 90 TABLET | Refills: 0 | Status: SHIPPED | OUTPATIENT
Start: 2025-03-26

## 2025-03-26 RX ORDER — TESTOSTERONE CYPIONATE 200 MG/ML
50 INJECTION, SOLUTION INTRAMUSCULAR WEEKLY
COMMUNITY
Start: 2024-06-21

## 2025-03-26 RX ORDER — AZELAIC ACID 0.15 G/G
GEL TOPICAL 2 TIMES DAILY
Qty: 50 G | Refills: 3 | Status: SHIPPED | OUTPATIENT
Start: 2025-03-26

## 2025-03-26 RX ORDER — VENLAFAXINE HYDROCHLORIDE 150 MG/1
150 CAPSULE, EXTENDED RELEASE ORAL
Status: CANCELLED | OUTPATIENT
Start: 2025-03-26

## 2025-03-26 ASSESSMENT — PATIENT HEALTH QUESTIONNAIRE - PHQ9
10. IF YOU CHECKED OFF ANY PROBLEMS, HOW DIFFICULT HAVE THESE PROBLEMS MADE IT FOR YOU TO DO YOUR WORK, TAKE CARE OF THINGS AT HOME, OR GET ALONG WITH OTHER PEOPLE: VERY DIFFICULT
SUM OF ALL RESPONSES TO PHQ QUESTIONS 1-9: 13

## 2025-03-26 NOTE — PROGRESS NOTES
Preventive Care Visit  [facility]  CRISTIANO MALDONADO MD, Family Medicine  Mar 26, 2025  {Provider  Link to SmartSet :197699}    {PROVIDER CHARTING PREFERENCE:440613}    Osvaldo Hill is a 24 year old, presenting for the following:  Physical (Rash/scaly skin in naval and outer corners of lips, started this winter. ), Depression (Pt states in the past year he's had extreme fatigue/sleepiness with his depression. ), and Recheck Medication (Venlafaxine - Pt would like to discuss rx)        3/26/2025    12:18 PM   Additional Questions   Roomed by Coco   Accompanied by Alone         3/26/2025    12:18 PM   Patient Reported Additional Medications   Patient reports taking the following new medications Pt is also taking venlafaxine (EFFEXOR-XR) 75 MG along with his 150 MG      {ALERT  Recent PHQ-9/EPDS score indicates suicidal ideations, document follow-up within the A/P section of the note :647833}{Provider Documentation  Link to C-SS[facility] ([facility]) Flowsheet :588888}  Via the Health Maintenance questionnaire, the patient has reported the following services have been completed -[facility]: [facility] [address] 2024[facility]01, this information has been sent to the abstraction team.    Lived in [place] his entire life. 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  Diagnosed with depression in middle school, treated in high school. Still has tendencies toward depression. Symptoms are fatigue and sleeping more. This causes problems with finishing work. Trying to be more active. Has friends. Feels he has not balanced his social life. Because he was abroad last year, he didn't get placed with his friends for this semester.   Senior. 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  Off PrEP due to not being sexually active recent.    Advance Care Planning  Patient does not have a Health Care Directive: Discussed advance care planning with patient; however, patient declined at this  time.      3/25/2025   General Health   How would you rate your overall physical health? (!) FAIR   Feel stress (tense, anxious, or unable to sleep) Rather much   (!) STRESS CONCERN      3/25/2025   Nutrition   Three or more servings of calcium each day? Yes   Diet: Regular (no restrictions)   How many servings of fruit and vegetables per day? (!) 2-3   How many sweetened beverages each day? 0-1         3/25/2025   Exercise   Days per week of moderate/strenous exercise 3 days   Average minutes spent exercising at this level 20 min         3/25/2025   Social Factors   Frequency of gathering with friends or relatives Twice a week   Worry food won't last until get money to buy more No   Food not last or not have enough money for food? No   Do you have housing? (Housing is defined as stable permanent housing and does not include staying ouside in a car, in a tent, in an abandoned building, in an overnight shelter, or couch-surfing.) Yes   Are you worried about losing your housing? No   Lack of transportation? No   Unable to get utilities (heat,electricity)? No         3/25/2025   Dental   Dentist two times every year? Yes         3/25/2025     9:34 PM   PHQ-9 SCORE   PHQ-9 Total Score MyChart 13 (Moderate depression)   PHQ-9 Total Score 13        Patient-reported         3/25/2025   Substance Use   Alcohol more than 3/day or more than 7/wk No   Do you use any other substances recreationally? No     Social History     Tobacco Use    Smoking status: Never    Smokeless tobacco: Never    Tobacco comments:     nonsmoking home   Vaping Use    Vaping status: Never Used   Substance Use Topics    Alcohol use: Yes     Comment: rarely; advised to hold 24hrs prior to DOS    Drug use: Never     {Provider  If there are gaps in the social history shown above, please follow the link to update and then refresh the note Link to Social and Substance History :629336}        3/25/2025   One time HIV Screening   Previous HIV test? Yes  "        3/25/2025   STI Screening   New sexual partner(s) since last STI/HIV test? (!) YES ***     History of abnormal Pap smear: No - age 21-29 PAP every 3 years recommended      3/25/2025   Contraception/Family Planning   Questions about contraception or family planning No     {Provider  REQUIRED FOR AWV Use the storyboard to review patient history, after sections have been marked as reviewed, refresh note to capture documentation:356178}   Reviewed and updated as needed this visit by Provider                  {HISTORY OPTIONS (Optional):524239}    {ROS Picklists (Optional):346329}     Objective    Exam  /81 (BP Location: Left arm, Patient Position: Sitting, Cuff Size: Adult Regular)   Pulse 100   Temp 97.8  F (36.6  C) (Tympanic)   Resp 18   Ht 1.683 m (5' 6.25\")   Wt 76 kg (167 lb 8 oz)   SpO2 98%   BMI 26.83 kg/m     Estimated body mass index is 26.83 kg/m  as calculated from the following:    Height as of this encounter: 1.683 m (5' 6.25\").    Weight as of this encounter: 76 kg (167 lb 8 oz).    Physical Exam  {Exam Choices (Optional):158044}  Signed Electronically by: CRISTIANO MALDONADO MD  {Email feedback regarding this note to [email or web address]   :571609}  " "    TRANSGENDER MASTECTOMY Bilateral 7/7/2023    Procedure: MASTECTOMY, BILATERAL, SIMPLE, with nipple grafts. OnQ;  Surgeon: Bria Phelps MD;  [place] [facility] [place]       Social History     Tobacco Use    Smoking status: Never    Smokeless tobacco: Never    Tobacco comments:     nonsmoking home   Substance Use Topics    Alcohol use: Yes     Comment: rarely; advised to hold 24hrs prior to DOS     History reviewed. No pertinent family history.      Current Outpatient Medications   Medication Sig Dispense Refill    azelaic acid (FINACIA) 15 % external gel Apply topically 2 times daily. 50 g 3    B-D SYRINGE LUER-ROBERT 1 ML MISC See Admin Instructions      BD DISP NEEDLES 25G X 5/8\" MISC USE FOR SELF INJECTION OF MEDICATIO      BD HYPODERMIC NEEDLE 18G X 1\" MISC USE AS DIRECTED FOR DRAWING UP MEDICATION      buPROPion (WELLBUTRIN XL) 150 MG 24 hr tablet Take 1 tablet (150 mg) by mouth every morning. 90 tablet 0    emtricitabine-tenofovir (TRUVADA) 200-300 MG per tablet Take 1 tablet by mouth daily at 2 pm      estradiol (ESTRACE) 0.1 MG/GM vaginal cream Place vaginally twice a week.      melatonin 5 MG CAPS       Omega-3 Fatty Acids (FISH OIL) 500 MG CAPS       testosterone cypionate (DEPOTESTOSTERONE) 200 MG/ML injection Inject 50 mg subcutaneously once a week.      venlafaxine (EFFEXOR XR) 150 MG 24 hr capsule Take 1 capsule (150 mg) by mouth daily. On 225 mg daily. 30 capsule 0     Allergies   Allergen Reactions    Cats     Sulfa Antibiotics      Mother has sulfa allergy      Objective    Exam  /81 (BP Location: Left arm, Patient Position: Sitting, Cuff Size: Adult Regular)   Pulse 100   Temp 97.8  F (36.6  C) (Tympanic)   Resp 18   Ht 1.683 m (5' 6.25\")   Wt 76 kg (167 lb 8 oz)   SpO2 98%   BMI 26.83 kg/m     Estimated body mass index is 26.83 kg/m  as calculated from the following:    Height as of this encounter: 1.683 m (5' 6.25\").    Weight as of this encounter: 76 kg (167 lb 8 oz).    Physical " Exam  GENERAL: healthy, alert and no distress  EYES: grossly normal to inspection, and conjunctivae and sclerae normal  RESP: breathing unlabored, no cough or throat clearing.  MS: no gross musculoskeletal defects noted.  SKIN: no suspicious lesions visible. Symmetric scaling and erythema below the corners of the mouth and a bit at the philtrum and nasal folds.  NEURO: Normal strength and tone, mentation intact and speech normal  PSYCH: mentation appears normal, affect normal/bright     Signed Electronically by: CRISTIANO MALDONADO MD

## 2025-05-05 ENCOUNTER — OFFICE VISIT (OUTPATIENT)
Dept: FAMILY MEDICINE | Facility: CLINIC | Age: 25
End: 2025-05-05
Payer: COMMERCIAL

## 2025-05-05 VITALS
SYSTOLIC BLOOD PRESSURE: 111 MMHG | WEIGHT: 170 LBS | HEART RATE: 95 BPM | BODY MASS INDEX: 27.32 KG/M2 | HEIGHT: 66 IN | OXYGEN SATURATION: 99 % | TEMPERATURE: 99.1 F | RESPIRATION RATE: 18 BRPM | DIASTOLIC BLOOD PRESSURE: 75 MMHG

## 2025-05-05 DIAGNOSIS — Z13.29 SCREENING FOR ENDOCRINE, NUTRITIONAL, METABOLIC AND IMMUNITY DISORDER: ICD-10-CM

## 2025-05-05 DIAGNOSIS — F33.1 MAJOR DEPRESSIVE DISORDER, RECURRENT EPISODE, MODERATE (H): ICD-10-CM

## 2025-05-05 DIAGNOSIS — Z13.21 SCREENING FOR ENDOCRINE, NUTRITIONAL, METABOLIC AND IMMUNITY DISORDER: ICD-10-CM

## 2025-05-05 DIAGNOSIS — Z13.228 SCREENING FOR ENDOCRINE, NUTRITIONAL, METABOLIC AND IMMUNITY DISORDER: ICD-10-CM

## 2025-05-05 DIAGNOSIS — Z00.00 ROUTINE GENERAL MEDICAL EXAMINATION AT A HEALTH CARE FACILITY: Primary | ICD-10-CM

## 2025-05-05 DIAGNOSIS — E79.0 HYPERURICEMIA: ICD-10-CM

## 2025-05-05 DIAGNOSIS — Z13.0 SCREENING FOR ENDOCRINE, NUTRITIONAL, METABOLIC AND IMMUNITY DISORDER: ICD-10-CM

## 2025-05-05 LAB
HCV AB SERPL QL IA: NONREACTIVE
URATE SERPL-MCNC: 9.1 MG/DL (ref 2.4–7)

## 2025-05-05 PROCEDURE — 96127 BRIEF EMOTIONAL/BEHAV ASSMT: CPT | Performed by: FAMILY MEDICINE

## 2025-05-05 PROCEDURE — 99395 PREV VISIT EST AGE 18-39: CPT | Performed by: FAMILY MEDICINE

## 2025-05-05 PROCEDURE — 3078F DIAST BP <80 MM HG: CPT | Performed by: FAMILY MEDICINE

## 2025-05-05 PROCEDURE — 84550 ASSAY OF BLOOD/URIC ACID: CPT | Performed by: FAMILY MEDICINE

## 2025-05-05 PROCEDURE — 99213 OFFICE O/P EST LOW 20 MIN: CPT | Mod: 25 | Performed by: FAMILY MEDICINE

## 2025-05-05 PROCEDURE — 3074F SYST BP LT 130 MM HG: CPT | Performed by: FAMILY MEDICINE

## 2025-05-05 PROCEDURE — 86803 HEPATITIS C AB TEST: CPT | Performed by: FAMILY MEDICINE

## 2025-05-05 PROCEDURE — 84403 ASSAY OF TOTAL TESTOSTERONE: CPT | Performed by: FAMILY MEDICINE

## 2025-05-05 PROCEDURE — 36415 COLL VENOUS BLD VENIPUNCTURE: CPT | Performed by: FAMILY MEDICINE

## 2025-05-05 PROCEDURE — 1126F AMNT PAIN NOTED NONE PRSNT: CPT | Performed by: FAMILY MEDICINE

## 2025-05-05 PROCEDURE — G2211 COMPLEX E/M VISIT ADD ON: HCPCS | Performed by: FAMILY MEDICINE

## 2025-05-05 RX ORDER — VENLAFAXINE HYDROCHLORIDE 75 MG/1
75 CAPSULE, EXTENDED RELEASE ORAL DAILY
Qty: 30 CAPSULE | Refills: 0 | Status: SHIPPED | OUTPATIENT
Start: 2025-05-05

## 2025-05-05 RX ORDER — ALLOPURINOL 100 MG/1
100 TABLET ORAL DAILY
Qty: 90 TABLET | Refills: 3 | Status: SHIPPED | OUTPATIENT
Start: 2025-05-05

## 2025-05-05 RX ORDER — TESTOSTERONE CYPIONATE 200 MG/ML
80 INJECTION, SOLUTION INTRAMUSCULAR WEEKLY
COMMUNITY
Start: 2025-05-05 | End: 2025-05-08

## 2025-05-05 SDOH — HEALTH STABILITY: PHYSICAL HEALTH: ON AVERAGE, HOW MANY DAYS PER WEEK DO YOU ENGAGE IN MODERATE TO STRENUOUS EXERCISE (LIKE A BRISK WALK)?: 3 DAYS

## 2025-05-05 SDOH — HEALTH STABILITY: PHYSICAL HEALTH: ON AVERAGE, HOW MANY MINUTES DO YOU ENGAGE IN EXERCISE AT THIS LEVEL?: 30 MIN

## 2025-05-05 ASSESSMENT — PATIENT HEALTH QUESTIONNAIRE - PHQ9: SUM OF ALL RESPONSES TO PHQ QUESTIONS 1-9: 7

## 2025-05-05 ASSESSMENT — SOCIAL DETERMINANTS OF HEALTH (SDOH): HOW OFTEN DO YOU GET TOGETHER WITH FRIENDS OR RELATIVES?: ONCE A WEEK

## 2025-05-05 ASSESSMENT — PAIN SCALES - GENERAL: PAINLEVEL_OUTOF10: NO PAIN (0)

## 2025-05-05 NOTE — PROGRESS NOTES
Preventive Care Visit  [facility]  CRISTIANO MALDONADO MD, Family Medicine  May 5, 2025      Assessment & Plan     Routine general medical examination at a health care facility    Major depressive disorder, recurrent episode, moderate (H)  In therapy.  He has been clinching his jaw. Hopefully, getting off the SNRI will help.  Will follow-up by a [employer] message in 4 weeks and consider increasing it to bupropion.  - venlafaxine (EFFEXOR XR) 75 MG 24 hr capsule; Take 1 capsule (75 mg) by mouth daily.    Hyperuricemia  Uric acid in about two months.  Allopurinol 100 mg daily    Screening for endocrine, nutritional, metabolic and immunity disorder  - Hepatitis C Screen Reflex to HCV RNA Quant and Genotype; Future  - Testosterone total; Future  - Sex Hormone Binding Globulin; Future  - Uric acid; Future  - Testosterone total  - Uric acid    Strong family history of gout. She does not consume high uric acid foods.  Counseling  Appropriate preventive services were addressed with this patient via screening, questionnaire, or discussion as appropriate for fall prevention, nutrition, physical activity, Tobacco-use cessation, social engagement, weight loss and cognition.  Checklist reviewing preventive services available has been given to the patient.  Reviewed patient's diet, addressing concerns and/or questions.   He is at risk for lack of exercise and has been provided with information to increase physical activity for the benefit of his well-being.   He is at risk for psychosocial distress and has been provided with information to reduce risk.   The patient's PHQ-9 score is consistent with mild depression. He was provided with information regarding depression.   Osvaldo Hill is a 24 year old, presenting for the following:  Physical (Pt reports they are here for annual physical. )        5/5/2025    10:09 AM   Additional Questions   Roomed by Lalo   Accompanied by alone         5/5/2025    10:09 AM    Patient Reported Additional Medications   Patient reports taking the following new medications none      Via the Health Maintenance questionnaire, the patient has reported the following services have been completed -[facility] [address] [facility]24-[facility], this information has been sent to the abstraction team.    Planning on gender-affirming hysterectomy-oophorectomy and phaloplasty in 5-10 years. Happy with his progress so far, there is still body dysphoria. Wants gender care through me now.  Using dumbbells 20 minutes a day. Twisted his ankle, so that slowed his brisk walking.  Planning on a Masters degree for testing.    Advance Care Planning    Discussed advance care planning with patient; informed AVS has link to [facility] [employer].      5/5/2025   General Health   How would you rate your overall physical health? Good   Feel stress (tense, anxious, or unable to sleep) To some extent   (!) STRESS CONCERN      5/5/2025   Nutrition   Three or more servings of calcium each day? Yes   Diet: Regular (no restrictions)   How many servings of fruit and vegetables per day? 4 or more   How many sweetened beverages each day? 0-1         5/5/2025   Exercise   Days per week of moderate/strenous exercise 3 days   Average minutes spent exercising at this level 30 min         5/5/2025   Social Factors   Frequency of gathering with friends or relatives Once a week   Worry food won't last until get money to buy more No   Food not last or not have enough money for food? No   Do you have housing? (Housing is defined as stable permanent housing and does not include staying outside in a car, in a tent, in an abandoned building, in an overnight shelter, or couch-surfing.) Yes   Are you worried about losing your housing? No   Lack of transportation? No   Unable to get utilities (heat,electricity)? No         5/5/2025   Dental   Dentist two times every year? Yes         5/5/2025    10:11 AM   PHQ-9 SCORE  "  PHQ-9 Total Score 7         5/5/2025   Substance Use   Alcohol more than 3/day or more than 7/wk No   Do you use any other substances recreationally? No     Social History     Tobacco Use    Smoking status: Never    Smokeless tobacco: Never    Tobacco comments:     nonsmoking home   Vaping Use    Vaping status: Never Used   Substance Use Topics    Alcohol use: Yes     Comment: rarely; advised to hold 24hrs prior to DOS    Drug use: Never         5/5/2025   STI Screening   New sexual partner(s) since last STI/HIV test? No         5/5/2025   Contraception/Family Planning   Questions about contraception or family planning No        Reviewed and updated as needed this visit by Provider                  Patient Active Problem List   Diagnosis    Gender dysphoria in adult    Major depressive disorder, recurrent episode, moderate (H)     Past Surgical History:   Procedure Laterality Date    BIOPSY  2015?    Mole removal    BREAST SURGERY  2023    Double Mastectomy for gender transition purposes    TRANSGENDER MASTECTOMY Bilateral 07/07/2023    Procedure: MASTECTOMY, BILATERAL, SIMPLE, with nipple grafts. OnQ;  Surgeon: Bria Phelps MD;  Location: [facility] [place]       Social History     Tobacco Use    Smoking status: Never    Smokeless tobacco: Never    Tobacco comments:     nonsmoking home   Substance Use Topics    Alcohol use: Yes     Comment: rarely     Family History   Problem Relation Age of Onset    Depression Mother     Unknown/Adopted Mother     Gout Father     Asthma Sister     Von Willebrand disease Sister         mild    Breast Cancer Paternal Grandmother     Colon Cancer Paternal Grandfather 83    Gout Paternal Grandfather     Substance Abuse Other         Alcoholism         Current Outpatient Medications   Medication Sig Dispense Refill    azelaic acid (FINACIA) 15 % external gel Apply topically 2 times daily. 50 g 3    B-D SYRINGE LUER-ROBERT 1 ML MISC See Admin Instructions      BD DISP NEEDLES 25G X 5/8\" " "MISC USE FOR SELF INJECTION OF MEDICATIO      BD HYPODERMIC NEEDLE 18G X 1\" MISC USE AS DIRECTED FOR DRAWING UP MEDICATION      buPROPion (WELLBUTRIN XL) 150 MG 24 hr tablet Take 1 tablet (150 mg) by mouth every morning. 90 tablet 0    emtricitabine-tenofovir (TRUVADA) 200-300 MG per tablet Take 1 tablet by mouth daily at 2 pm      estradiol (ESTRACE) 0.1 MG/GM vaginal cream Place vaginally twice a week.      melatonin 5 MG CAPS       Omega-3 Fatty Acids (FISH OIL) 500 MG CAPS       testosterone cypionate (DEPOTESTOSTERONE) 200 MG/ML injection Inject 0.4 mLs (80 mg) subcutaneously once a week.      testosterone cypionate (DEPOTESTOSTERONE) 200 MG/ML injection Inject 50 mg subcutaneously once a week.      venlafaxine (EFFEXOR XR) 75 MG 24 hr capsule Take 1 capsule (75 mg) by mouth daily. 30 capsule 0     Allergies   Allergen Reactions    Cats     Sulfa Antibiotics      Mother has sulfa allergy   Review of Systems  Constitutional, HEENT, cardiovascular, pulmonary, GI, , musculoskeletal, neuro, skin, endocrine and psych systems are negative, except as otherwise noted.     Objective    Exam  /75 (BP Location: Left arm, Patient Position: Sitting, Cuff Size: Adult Regular)   Pulse 95   Temp 99.1  F (37.3  C) (Temporal)   Resp 18   Ht 1.676 m (5' 6\")   Wt 77.1 kg (170 lb)   SpO2 99%   BMI 27.44 kg/m     Estimated body mass index is 27.44 kg/m  as calculated from the following:    Height as of this encounter: 1.676 m (5' 6\").    Weight as of this encounter: 77.1 kg (170 lb).    Physical Exam  GENERAL: alert and no distress  EYES: Eyes grossly normal to inspection, PERRL and conjunctivae and sclerae normal  HENT: ear canals and TM's normal, nose and mouth without ulcers or lesions  NECK: no adenopathy, no asymmetry, masses, or scars  RESP: lungs clear to auscultation - no rales, rhonchi or wheezes  CV: regular rate and rhythm, normal S1 S2, no S3 or S4, no murmur, click or rub, no peripheral edema  ABDOMEN: " soft, nontender, no hepatosplenomegaly, no masses and bowel sounds normal  MS: no gross musculoskeletal defects noted, no edema  SKIN: no suspicious lesions or rashes  NEURO: Normal strength and tone, mentation intact and speech normal  PSYCH: mentation appears normal, affect normal/bright  Signed Electronically by: CRISTIANO MALDONADO MD

## 2025-05-05 NOTE — PATIENT INSTRUCTIONS
Patient Education   Preventive Care Advice   This is general advice given by our system to help you stay healthy. However, your care team may have specific advice just for you. Please talk to your care team about your preventive care needs.  Nutrition  Eat 5 or more servings of fruits and vegetables each day.  Try wheat bread, brown rice and whole grain pasta (instead of white bread, rice, and pasta).  Get enough calcium and vitamin D. Check the label on foods and aim for 100% of the RDA (recommended daily allowance).  Lifestyle  Exercise at least 150 minutes each week  (30 minutes a day, 5 days a week).  Do muscle strengthening activities 2 days a week. These help control your weight and prevent disease.  No smoking.  Wear sunscreen to prevent skin cancer.  Have a dental exam and cleaning every 6 months.  Yearly exams  See your health care team every year to talk about:  Any changes in your health.  Any medicines your care team has prescribed.  Preventive care, family planning, and ways to prevent chronic diseases.  Shots (vaccines)   HPV shots (up to age 26), if you've never had them before.  Hepatitis B shots (up to age 59), if you've never had them before.  COVID-19 shot: Get this shot when it's due.  Flu shot: Get a flu shot every year.  Tetanus shot: Get a tetanus shot every 10 years.  Pneumococcal, hepatitis A, and RSV shots: Ask your care team if you need these based on your risk.  Shingles shot (for age 50 and up)  General health tests  Diabetes screening:  Starting at age 35, Get screened for diabetes at least every 3 years.  If you are younger than age 35, ask your care team if you should be screened for diabetes.  Cholesterol test: At age 39, start having a cholesterol test every 5 years, or more often if advised.  Bone density scan (DEXA): At age 50, ask your care team if you should have this scan for osteoporosis (brittle bones).  Hepatitis C: Get tested at least once in your life.  STIs (sexually  transmitted infections)  Before age 24: Ask your care team if you should be screened for STIs.  After age 24: Get screened for STIs if you're at risk. You are at risk for STIs (including HIV) if:  You are sexually active with more than one person.  You don't use condoms every time.  You or a partner was diagnosed with a sexually transmitted infection.  If you are at risk for HIV, ask about PrEP medicine to prevent HIV.  Get tested for HIV at least once in your life, whether you are at risk for HIV or not.  Cancer screening tests  Cervical cancer screening: If you have a cervix, begin getting regular cervical cancer screening tests starting at age 21.  Breast cancer scan (mammogram): If you've ever had breasts, begin having regular mammograms starting at age 40. This is a scan to check for breast cancer.  Colon cancer screening: It is important to start screening for colon cancer at age 45.  Have a colonoscopy test every 10 years (or more often if you're at risk) Or, ask your provider about stool tests like a FIT test every year or Cologuard test every 3 years.  To learn more about your testing options, visit:   .  For help making a decision, visit:   https://bit.ly/wp75514.  Prostate cancer screening test: If you have a prostate, ask your care team if a prostate cancer screening test (PSA) at age 55 is right for you.  Lung cancer screening: If you are a current or former smoker ages 50 to 80, ask your care team if ongoing lung cancer screenings are right for you.  For informational purposes only. Not to replace the advice of your health care provider. Copyright   2023 [facility]. All rights reserved. Clinically reviewed by the [facility]. ibeatyou [id number] - REV 01/24.  Learning About Stress  What is stress?     Stress is your body's response to a hard situation. Your body can have a physical, emotional, or mental response. Stress is a fact of life for most people, and it  affects everyone differently. What causes stress for you may not be stressful for someone else.  A lot of things can cause stress. You may feel stress when you go on a job interview, take a test, or run a race. This kind of short-term stress is normal and even useful. It can help you if you need to work hard or react quickly. For example, stress can help you finish an important job on time.  Long-term stress is caused by ongoing stressful situations or events. Examples of long-term stress include long-term health problems, ongoing problems at work, or conflicts in your family. Long-term stress can harm your health.  How does stress affect your health?  When you are stressed, your body responds as though you are in danger. It makes hormones that speed up your heart, make you breathe faster, and give you a burst of energy. This is called the fight-or-flight stress response. If the stress is over quickly, your body goes back to normal and no harm is done.  But if stress happens too often or lasts too long, it can have bad effects. Long-term stress can make you more likely to get sick, and it can make symptoms of some diseases worse. If you tense up when you are stressed, you may develop neck, shoulder, or low back pain. Stress is linked to high blood pressure and heart disease.  Stress also harms your emotional health. It can make you kyle, tense, or depressed. Your relationships may suffer, and you may not do well at work or school.  What can you do to manage stress?  You can try these things to help manage stress:   Do something active. Exercise or activity can help reduce stress. Walking is a great way to get started. Even everyday activities such as housecleaning or yard work can help.  Try yoga or robbin chi. These techniques combine exercise and meditation. You may need some training at first to learn them.  Do something you enjoy. For example, listen to music or go to a movie. Practice your hobby or do volunteer  "work.  Meditate. This can help you relax, because you are not worrying about what happened before or what may happen in the future.  Do guided imagery. Imagine yourself in any setting that helps you feel calm. You can use online videos, books, or a teacher to guide you.  Do breathing exercises. For example:  From a standing position, bend forward from the waist with your knees slightly bent. Let your arms dangle close to the floor.  Breathe in slowly and deeply as you return to a standing position. Roll up slowly and lift your head last.  Hold your breath for just a few seconds in the standing position.  Breathe out slowly and bend forward from the waist.  Let your feelings out. Talk, laugh, cry, and express anger when you need to. Talking with supportive friends or family, a counselor, or a gia leader about your feelings is a healthy way to relieve stress. Avoid discussing your feelings with people who make you feel worse.  Write. It may help to write about things that are bothering you. This helps you find out how much stress you feel and what is causing it. When you know this, you can find better ways to cope.  What can you do to prevent stress?  You might try some of these things to help prevent stress:  Manage your time. This helps you find time to do the things you want and need to do.  Get enough sleep. Your body recovers from the stresses of the day while you are sleeping.  Get support. Your family, friends, and community can make a difference in how you experience stress.  Limit your news feed. Avoid or limit time on social media or news that may make you feel stressed.  Do something active. Exercise or activity can help reduce stress. Walking is a great way to get started.  Where can you learn more?  Go to https://www.DreamNotes.net/patiented  Enter N032 in the search box to learn more about \"Learning About Stress.\"  Current as of: October 24, 2024  Content Version: 14.4 2024-2025 [employer]   Care instructions adapted under license by your healthcare professional. If you have questions about a medical condition or this instruction, always ask your healthcare professional. [employer] disclaims any warranty or liability for your use of this information.    Learning About Depression Screening  What is depression screening?  Depression screening is a way to see if you have depression symptoms. It may be done by a doctor or counselor. It's often part of a routine checkup. That's because your mental health is just as important as your physical health.  Depression is a mental health condition that affects how you feel, think, and act. You may:  Have less energy.  Lose interest in your daily activities.  Feel sad and grouchy for a long time.  Depression is very common. It affects people of all ages.  Many things can lead to depression. Some people become depressed after they have a stroke or find out they have a major illness like cancer or heart disease. The death of a loved one or a breakup may lead to depression. It can run in families. Most experts believe that a combination of inherited genes and stressful life events can cause it.  What happens during screening?  You may be asked to fill out a form about your depression symptoms. You and the doctor will discuss your answers. The doctor may ask you more questions to learn more about how you think, act, and feel.  What happens after screening?  If you have symptoms of depression, your doctor will talk to you about your options.  Doctors usually treat depression with medicines or counseling. Often, combining the two works best. Many people don't get help because they think that they'll get over the depression on their own. But people with depression may not get better unless they get treatment.  The cause of depression is not well understood. There may be many factors involved. But if you have depression, it's not your fault.  A serious  "symptom of depression is thinking about death or suicide. If you or someone you care about talks about this or about feeling hopeless, get help right away.  It's important to know that depression can be treated. Medicine, counseling, and self-care may help.  Where can you learn more?  Go to https://www.[employer]CER[employer].net/patiented  Enter T185 in the search box to learn more about \"Learning About Depression Screening.\"  Current as of: July 31, 2024  Content Version: 14.4    [id number] [employer].   Care instructions adapted under license by your healthcare professional. If you have questions about a medical condition or this instruction, always ask your healthcare professional. [employer] disclaims any warranty or liability for your use of this information.       "

## 2025-05-06 LAB — TESTOST SERPL-MCNC: 510 NG/DL (ref 8–950)

## 2025-05-08 DIAGNOSIS — F64.0 GENDER DYSPHORIA IN ADULT: Primary | ICD-10-CM

## 2025-05-08 RX ORDER — TESTOSTERONE CYPIONATE 200 MG/ML
80 INJECTION, SOLUTION INTRAMUSCULAR WEEKLY
Qty: 12 ML | Refills: 3 | Status: SHIPPED | OUTPATIENT
Start: 2025-05-08

## 2025-06-03 ENCOUNTER — MYC MEDICAL ADVICE (OUTPATIENT)
Dept: FAMILY MEDICINE | Facility: CLINIC | Age: 25
End: 2025-06-03
Payer: COMMERCIAL

## 2025-06-04 DIAGNOSIS — F33.1 MAJOR DEPRESSIVE DISORDER, RECURRENT EPISODE, MODERATE (H): ICD-10-CM

## 2025-06-04 DIAGNOSIS — E79.0 HYPERURICEMIA: Primary | ICD-10-CM

## 2025-06-04 RX ORDER — VENLAFAXINE HYDROCHLORIDE 37.5 MG/1
37.5 CAPSULE, EXTENDED RELEASE ORAL DAILY
Qty: 30 CAPSULE | Refills: 0 | Status: SHIPPED | OUTPATIENT
Start: 2025-06-04

## 2025-06-04 RX ORDER — ALLOPURINOL 100 MG/1
100 TABLET ORAL DAILY
Qty: 90 TABLET | Refills: 3 | Status: SHIPPED | OUTPATIENT
Start: 2025-06-04

## 2025-06-14 DIAGNOSIS — F33.1 MAJOR DEPRESSIVE DISORDER, RECURRENT EPISODE, MODERATE (H): ICD-10-CM

## 2025-06-17 RX ORDER — VENLAFAXINE HYDROCHLORIDE 75 MG/1
75 CAPSULE, EXTENDED RELEASE ORAL DAILY
Qty: 30 CAPSULE | Refills: 0 | OUTPATIENT
Start: 2025-06-17

## 2025-06-23 DIAGNOSIS — F33.1 MAJOR DEPRESSIVE DISORDER, RECURRENT EPISODE, MODERATE (H): ICD-10-CM

## 2025-06-25 RX ORDER — BUPROPION HYDROCHLORIDE 150 MG/1
150 TABLET ORAL EVERY MORNING
Qty: 90 TABLET | Refills: 0 | Status: SHIPPED | OUTPATIENT
Start: 2025-06-25

## 2025-08-12 ENCOUNTER — OFFICE VISIT (OUTPATIENT)
Dept: FAMILY MEDICINE | Facility: CLINIC | Age: 25
End: 2025-08-12
Payer: COMMERCIAL

## 2025-08-12 VITALS
DIASTOLIC BLOOD PRESSURE: 60 MMHG | OXYGEN SATURATION: 98 % | HEART RATE: 86 BPM | BODY MASS INDEX: 26.92 KG/M2 | WEIGHT: 167.5 LBS | HEIGHT: 66 IN | TEMPERATURE: 97.8 F | SYSTOLIC BLOOD PRESSURE: 110 MMHG | RESPIRATION RATE: 10 BRPM

## 2025-08-12 DIAGNOSIS — F33.1 MAJOR DEPRESSIVE DISORDER, RECURRENT EPISODE, MODERATE (H): ICD-10-CM

## 2025-08-12 DIAGNOSIS — Z13.0 SCREENING FOR ENDOCRINE, NUTRITIONAL, METABOLIC AND IMMUNITY DISORDER: ICD-10-CM

## 2025-08-12 DIAGNOSIS — N39.3 STRESS INCONTINENCE OF URINE: Primary | ICD-10-CM

## 2025-08-12 DIAGNOSIS — Z13.29 SCREENING FOR ENDOCRINE, NUTRITIONAL, METABOLIC AND IMMUNITY DISORDER: ICD-10-CM

## 2025-08-12 DIAGNOSIS — Z13.228 SCREENING FOR ENDOCRINE, NUTRITIONAL, METABOLIC AND IMMUNITY DISORDER: ICD-10-CM

## 2025-08-12 DIAGNOSIS — Z13.21 SCREENING FOR ENDOCRINE, NUTRITIONAL, METABOLIC AND IMMUNITY DISORDER: ICD-10-CM

## 2025-08-12 DIAGNOSIS — E79.0 HYPERURICEMIA: ICD-10-CM

## 2025-08-12 LAB
SHBG SERPL-SCNC: 9 NMOL/L (ref 11–135)
URATE SERPL-MCNC: 7.8 MG/DL (ref 2.4–7)

## 2025-08-12 PROCEDURE — G2211 COMPLEX E/M VISIT ADD ON: HCPCS | Performed by: FAMILY MEDICINE

## 2025-08-12 PROCEDURE — 84270 ASSAY OF SEX HORMONE GLOBUL: CPT | Performed by: FAMILY MEDICINE

## 2025-08-12 PROCEDURE — 36415 COLL VENOUS BLD VENIPUNCTURE: CPT | Performed by: FAMILY MEDICINE

## 2025-08-12 PROCEDURE — 99214 OFFICE O/P EST MOD 30 MIN: CPT | Performed by: FAMILY MEDICINE

## 2025-08-12 PROCEDURE — 3074F SYST BP LT 130 MM HG: CPT | Performed by: FAMILY MEDICINE

## 2025-08-12 PROCEDURE — 84550 ASSAY OF BLOOD/URIC ACID: CPT | Performed by: FAMILY MEDICINE

## 2025-08-12 PROCEDURE — 3078F DIAST BP <80 MM HG: CPT | Performed by: FAMILY MEDICINE

## 2025-08-12 RX ORDER — BUPROPION HYDROCHLORIDE 150 MG/1
150 TABLET ORAL EVERY MORNING
Qty: 90 TABLET | Refills: 3 | Status: SHIPPED | OUTPATIENT
Start: 2025-08-12

## 2025-08-12 RX ORDER — ESTRADIOL 0.1 MG/G
CREAM VAGINAL
Qty: 42.5 G | Refills: 1 | Status: SHIPPED | OUTPATIENT
Start: 2025-08-13

## 2025-08-12 RX ORDER — ALLOPURINOL 100 MG/1
100 TABLET ORAL DAILY
Qty: 90 TABLET | Refills: 3 | Status: CANCELLED | OUTPATIENT
Start: 2025-08-12

## 2025-08-12 ASSESSMENT — PATIENT HEALTH QUESTIONNAIRE - PHQ9
SUM OF ALL RESPONSES TO PHQ QUESTIONS 1-9: 5
SUM OF ALL RESPONSES TO PHQ QUESTIONS 1-9: 5
10. IF YOU CHECKED OFF ANY PROBLEMS, HOW DIFFICULT HAVE THESE PROBLEMS MADE IT FOR YOU TO DO YOUR WORK, TAKE CARE OF THINGS AT HOME, OR GET ALONG WITH OTHER PEOPLE: NOT DIFFICULT AT ALL

## (undated) DEVICE — DRAIN JACKSON PRATT RESERVOIR 100ML SU130-1305

## (undated) DEVICE — PEN MARKING SKIN W/PAPER RULER 31145785

## (undated) DEVICE — SOL ADH LIQUID BENZOIN SWAB 0.6ML C1544

## (undated) DEVICE — DRAPE LAP TRANSVERSE 29421

## (undated) DEVICE — PEN MARKING SKIN W/LABELS 31145884

## (undated) DEVICE — BNDG ELASTIC 6"X5YDS UNSTERILE 6611-60

## (undated) DEVICE — STPL SKIN 35W APPOSE 8886803712

## (undated) DEVICE — ESU ELEC BLADE HEX-LOCKING 2.5" E1450X

## (undated) DEVICE — SUCTION MANIFOLD NEPTUNE 2 SYS 1 PORT 702-025-000

## (undated) DEVICE — SPONGE LAP 18X18" X8435

## (undated) DEVICE — SOL NACL 0.9% IRRIG 500ML BOTTLE 2F7123

## (undated) DEVICE — SU VICRYL 3-0 FS-1 27" J442H

## (undated) DEVICE — BNDG ELASTIC 6" DBL LENGTH UNSTERILE 6611-16

## (undated) DEVICE — BLADE KNIFE SURG 10 371110

## (undated) DEVICE — DRSG KERLIX 4 1/2"X4YDS ROLL 6730

## (undated) DEVICE — ESU GROUND PAD ADULT W/CORD E7507

## (undated) DEVICE — SU DERMABOND PRINEO 42CM CLR422US

## (undated) DEVICE — SUCTION TIP YANKAUER W/O VENT K86

## (undated) DEVICE — PACK MINOR CUSTOM ASC

## (undated) DEVICE — PAD CHUX UNDERPAD 30X30"

## (undated) DEVICE — GLOVE PROTEXIS MICRO 6.5  2D73PM65

## (undated) DEVICE — DRAIN JACKSON PRATT CHANNEL 15FR ROUND HUBLESS SIL JP-2228

## (undated) DEVICE — SU VICRYL 2-0 CT-2 27" UND J269H

## (undated) DEVICE — BLADE KNIFE SURG 15 371115

## (undated) DEVICE — PREP CHLORAPREP 26ML TINTED ORANGE  260815

## (undated) DEVICE — LINEN TOWEL PACK X5 5464

## (undated) DEVICE — DRSG ABDOMINAL 07 1/2X8" 7197D

## (undated) DEVICE — ESU PENCIL SMOKE EVAC W/ROCKER SWITCH 0703-047-000

## (undated) DEVICE — RX BACITRACIN OINTMENT 14G 0.5OZ 45802-060-01

## (undated) DEVICE — STRAP KNEE/BODY 31143004

## (undated) DEVICE — SU VICRYL 4-0 PS-2 18" UND J496H

## (undated) DEVICE — CATH TRAY FOLEY SURESTEP 16FR W/DRAIN BAG LF A300416A

## (undated) DEVICE — EYE MARKING PAD 581057

## (undated) DEVICE — SU ETHILON 3-0 PS-1 18" 1663H

## (undated) RX ORDER — TRIAMCINOLONE ACETONIDE 40 MG/ML
INJECTION, SUSPENSION INTRA-ARTICULAR; INTRAMUSCULAR
Status: DISPENSED
Start: 2024-01-12

## (undated) RX ORDER — ONDANSETRON 4 MG/1
TABLET, ORALLY DISINTEGRATING ORAL
Status: DISPENSED
Start: 2023-07-07

## (undated) RX ORDER — LIDOCAINE HYDROCHLORIDE 10 MG/ML
INJECTION, SOLUTION EPIDURAL; INFILTRATION; INTRACAUDAL; PERINEURAL
Status: DISPENSED
Start: 2024-01-12

## (undated) RX ORDER — HYDROMORPHONE HYDROCHLORIDE 1 MG/ML
INJECTION, SOLUTION INTRAMUSCULAR; INTRAVENOUS; SUBCUTANEOUS
Status: DISPENSED
Start: 2023-07-07

## (undated) RX ORDER — FENTANYL CITRATE 50 UG/ML
INJECTION, SOLUTION INTRAMUSCULAR; INTRAVENOUS
Status: DISPENSED
Start: 2023-07-07

## (undated) RX ORDER — PROPOFOL 10 MG/ML
INJECTION, EMULSION INTRAVENOUS
Status: DISPENSED
Start: 2023-07-07

## (undated) RX ORDER — ACETAMINOPHEN 325 MG/1
TABLET ORAL
Status: DISPENSED
Start: 2023-07-07

## (undated) RX ORDER — CEFAZOLIN SODIUM 2 G/50ML
SOLUTION INTRAVENOUS
Status: DISPENSED
Start: 2023-07-07

## (undated) RX ORDER — OXYCODONE HYDROCHLORIDE 5 MG/1
TABLET ORAL
Status: DISPENSED
Start: 2023-07-07